# Patient Record
Sex: FEMALE | Race: WHITE | NOT HISPANIC OR LATINO | Employment: FULL TIME | URBAN - METROPOLITAN AREA
[De-identification: names, ages, dates, MRNs, and addresses within clinical notes are randomized per-mention and may not be internally consistent; named-entity substitution may affect disease eponyms.]

---

## 2018-04-07 ENCOUNTER — HOSPITAL ENCOUNTER (EMERGENCY)
Facility: CLINIC | Age: 33
Discharge: HOME | End: 2018-04-07
Attending: EMERGENCY MEDICINE | Admitting: EMERGENCY MEDICINE

## 2018-04-07 ENCOUNTER — APPOINTMENT (OUTPATIENT)
Dept: GENERAL RADIOLOGY | Facility: CLINIC | Age: 33
End: 2018-04-07
Attending: EMERGENCY MEDICINE

## 2018-04-07 VITALS
WEIGHT: 160 LBS | SYSTOLIC BLOOD PRESSURE: 84 MMHG | OXYGEN SATURATION: 99 % | HEART RATE: 79 BPM | TEMPERATURE: 97.9 F | BODY MASS INDEX: 28.35 KG/M2 | DIASTOLIC BLOOD PRESSURE: 50 MMHG

## 2018-04-07 DIAGNOSIS — M25.552 HIP PAIN, LEFT: ICD-10-CM

## 2018-04-07 LAB
BASOPHILS # BLD AUTO: 0.1 10E9/L (ref 0–0.2)
BASOPHILS NFR BLD AUTO: 0.5 %
CK SERPL-CCNC: 80 U/L (ref 30–225)
DIFFERENTIAL METHOD BLD: NORMAL
EOSINOPHIL # BLD AUTO: 0.2 10E9/L (ref 0–0.7)
EOSINOPHIL NFR BLD AUTO: 1.4 %
ERYTHROCYTE [DISTWIDTH] IN BLOOD BY AUTOMATED COUNT: 13.2 % (ref 10–15)
ERYTHROCYTE [SEDIMENTATION RATE] IN BLOOD BY WESTERGREN METHOD: 11 MM/H (ref 0–20)
HCT VFR BLD AUTO: 39.3 % (ref 35–47)
HGB BLD-MCNC: 12.8 G/DL (ref 11.7–15.7)
IMM GRANULOCYTES # BLD: 0 10E9/L (ref 0–0.4)
IMM GRANULOCYTES NFR BLD: 0.3 %
LYMPHOCYTES # BLD AUTO: 3.8 10E9/L (ref 0.8–5.3)
LYMPHOCYTES NFR BLD AUTO: 34.7 %
MCH RBC QN AUTO: 29.4 PG (ref 26.5–33)
MCHC RBC AUTO-ENTMCNC: 32.6 G/DL (ref 31.5–36.5)
MCV RBC AUTO: 90 FL (ref 78–100)
MONOCYTES # BLD AUTO: 0.8 10E9/L (ref 0–1.3)
MONOCYTES NFR BLD AUTO: 7.5 %
NEUTROPHILS # BLD AUTO: 6.1 10E9/L (ref 1.6–8.3)
NEUTROPHILS NFR BLD AUTO: 55.6 %
NRBC # BLD AUTO: 0 10*3/UL
NRBC BLD AUTO-RTO: 0 /100
PLATELET # BLD AUTO: 278 10E9/L (ref 150–450)
RBC # BLD AUTO: 4.35 10E12/L (ref 3.8–5.2)
WBC # BLD AUTO: 10.9 10E9/L (ref 4–11)

## 2018-04-07 PROCEDURE — 99284 EMERGENCY DEPT VISIT MOD MDM: CPT | Mod: 25

## 2018-04-07 PROCEDURE — 73502 X-RAY EXAM HIP UNI 2-3 VIEWS: CPT

## 2018-04-07 PROCEDURE — 96374 THER/PROPH/DIAG INJ IV PUSH: CPT

## 2018-04-07 PROCEDURE — 85025 COMPLETE CBC W/AUTO DIFF WBC: CPT | Performed by: EMERGENCY MEDICINE

## 2018-04-07 PROCEDURE — 82550 ASSAY OF CK (CPK): CPT | Performed by: EMERGENCY MEDICINE

## 2018-04-07 PROCEDURE — 85652 RBC SED RATE AUTOMATED: CPT | Performed by: EMERGENCY MEDICINE

## 2018-04-07 PROCEDURE — 25000128 H RX IP 250 OP 636: Performed by: EMERGENCY MEDICINE

## 2018-04-07 RX ORDER — KETOROLAC TROMETHAMINE 15 MG/ML
15 INJECTION, SOLUTION INTRAMUSCULAR; INTRAVENOUS ONCE
Status: COMPLETED | OUTPATIENT
Start: 2018-04-07 | End: 2018-04-07

## 2018-04-07 RX ADMIN — KETOROLAC TROMETHAMINE 15 MG: 15 INJECTION, SOLUTION INTRAMUSCULAR; INTRAVENOUS at 10:11

## 2018-04-07 ASSESSMENT — ENCOUNTER SYMPTOMS
WEAKNESS: 0
COLOR CHANGE: 0
JOINT SWELLING: 0
BACK PAIN: 0
NUMBNESS: 0

## 2018-04-07 NOTE — DISCHARGE INSTRUCTIONS
Please follow-up with your primary care provider and physical therapy.    Avoid lifting more than 10 #    Scheduled tylenol/ibuprofen for pain.    Discharge Instructions  Hip Pain, suspect secondary to lumbar radiculopathy  You were seen today for hip pain. Back pain can have many causes, but most will get better without surgery or other specific treatment. Sometimes there is a herniated ( slipped ) disc. We do not usually do MRI scans to look for these right away, since most herniated discs will get better on their own with time.  Today, we did not find any evidence that your back pain was caused by a serious condition. However, sometimes symptoms develop over time and cannot be found during an emergency visit, so it is very important that you follow up with your primary provider.  Generally, every Emergency Department visit should have a follow-up clinic visit with either a primary or a specialty clinic/provider. Please follow-up as instructed by your emergency provider today.    Return to the Emergency Department if:    You develop a fever with your back pain.     You have weakness or change in sensation in one or both legs.    You lose control of your bowels or bladder, or cannot empty your bladder (cannot pee).    Your pain gets much worse.     Follow-up with your provider:    Unless your pain has completely gone away, please make an appointment with your provider within one week. Most of the routine care for back pain is available in a clinic and not the Emergency Department. You may need further management of your back pain, such as more pain medication, imaging such as an X-ray or MRI, or physical therapy.    What can I do to help myself?    Remain Active -- People are often afraid that they will hurt their back further or delay recovery by remaining active, but this is one of the best things you can do for your back. In fact, staying in bed for a long time to rest is not recommended. Studies have shown that  people with low back pain recover faster when they remain active. Movement helps to bring blood flow to the muscles and relieve muscle spasms as well as preventing loss of muscle strength.    Heat -- Using a heating pad can help with low back pain during the first few weeks. Do not sleep with a heating pad, as you can be burned.     Pain medications - You may take a pain medication such as Tylenol  (acetaminophen), Advil , Motrin  (ibuprofen) or Aleve  (naproxen).  If you were given a prescription for medicine here today, be sure to read all of the information (including the package insert) that comes with your prescription.  This will include important information about the medicine, its side effects, and any warnings that you need to know about.  The pharmacist who fills the prescription can provide more information and answer questions you may have about the medicine.  If you have questions or concerns that the pharmacist cannot address, please call or return to the Emergency Department.   Remember that you can always come back to the Emergency Department if you are not able to see your regular provider in the amount of time listed above, if you get any new symptoms, or if there is anything that worries you.

## 2018-04-07 NOTE — ED NOTES
Pt presents to ED c/o left hip pain. Ongoing for the last 4d. Insidious onset, denies precipitating event. States she is a  and spends a lot on her feet. Denies hx of the pain, denies back pain. Denies numbness or tingling. Is able to bear weight. Pt is A&O x4, ABCs intact.

## 2018-04-07 NOTE — ED AVS SNAPSHOT
[facility] Department    [address]    [facility] [address]    Phone:  [phone]    Fax:  [phone]                                       Karely Munoz   MRN: [id number]    Department:  [facility] Emergency Department   Date of Visit:  4/7/2018           Patient Information     Date Of Birth          1985        Your diagnoses for this visit were:     Hip pain, left        You were seen by Donal Zuñiga MD.      Follow-up Information     Follow up with [facility]. Schedule an appointment as soon as possible for a visit in 2 days.    Contact information:    [address] [facility] [address] [phone]          Follow up with [facility] In 3 days.    Contact information:    [address]  [facility] [id number] [phone]        Follow up with [facility].    Specialty:  EMERGENCY MEDICINE    Why:  If symptoms worsen    Contact information:    [address]  [facility] [address] [phone]        Discharge Instructions       Please follow-up with your primary care provider and physical therapy.    Avoid lifting more than 10 #    Scheduled tylenol/ibuprofen for pain.    Discharge Instructions  Hip Pain, suspect secondary to lumbar radiculopathy  You were seen today for hip pain. Back pain can have many causes, but most will get better without surgery or other specific treatment. Sometimes there is a herniated ( slipped ) disc. We do not usually do MRI scans to look for these right away, since most herniated discs will get better on their own with time.  Today, we did not find any evidence that your back pain was caused by a serious condition. However, sometimes symptoms develop over time and cannot be found during an emergency visit, so it is very important that you follow up with your primary provider.  Generally, every Emergency  Department visit should have a follow-up clinic visit with either a primary or a specialty clinic/provider. Please follow-up as instructed by your emergency provider today.    Return to the Emergency Department if:    You develop a fever with your back pain.     You have weakness or change in sensation in one or both legs.    You lose control of your bowels or bladder, or cannot empty your bladder (cannot pee).    Your pain gets much worse.     Follow-up with your provider:    Unless your pain has completely gone away, please make an appointment with your provider within one week. Most of the routine care for back pain is available in a clinic and not the Emergency Department. You may need further management of your back pain, such as more pain medication, imaging such as an X-ray or MRI, or physical therapy.    What can I do to help myself?    Remain Active -- People are often afraid that they will hurt their back further or delay recovery by remaining active, but this is one of the best things you can do for your back. In fact, staying in bed for a long time to rest is not recommended. Studies have shown that people with low back pain recover faster when they remain active. Movement helps to bring blood flow to the muscles and relieve muscle spasms as well as preventing loss of muscle strength.    Heat -- Using a heating pad can help with low back pain during the first few weeks. Do not sleep with a heating pad, as you can be burned.     Pain medications - You may take a pain medication such as Tylenol  (acetaminophen), Advil , Motrin  (ibuprofen) or Aleve  (naproxen).  If you were given a prescription for medicine here today, be sure to read all of the information (including the package insert) that comes with your prescription.  This will include important information about the medicine, its side effects, and any warnings that you need to know about.  The pharmacist who fills the prescription can provide more  information and answer questions you may have about the medicine.  If you have questions or concerns that the pharmacist cannot address, please call or return to the Emergency Department.   Remember that you can always come back to the Emergency Department if you are not able to see your regular provider in the amount of time listed above, if you get any new symptoms, or if there is anything that worries you.      24 Hour Appointment Hotline       To make an appointment at any [facility], call [phone] ([phone]). If you don't have a family doctor or clinic, we will help you find one. [facility] are conveniently located to serve the needs of you and your family.             Review of your medicines      Our records show that you are taking the medicines listed below. If these are incorrect, please call your family doctor or clinic.        Dose / Directions Last dose taken    NO ACTIVE MEDICATIONS        Refills:  0                Procedures and tests performed during your visit     CBC + differential    CK total    Erythrocyte sedimentation rate auto    XR Pelvis and Hip Left 1 View      Orders Needing Specimen Collection     None      Pending Results     Date and Time Order Name Status Description    4/7/2018 1000 XR Pelvis and Hip Left 1 View Preliminary             Pending Culture Results     No orders found from 4/5/2018 to 4/8/2018.            Pending Results Instructions     If you had any lab results that were not finalized at the time of your Discharge, you can call the ED Lab Result RN at [phone]. You will be contacted by this team for any positive Lab results or changes in treatment. The nurses are available 7 days a week from 10A to 6:30P.  You can leave a message 24 hours per day and they will return your call.        Test Results From Your Hospital Stay        4/7/2018 10:41 AM      Narrative     PELVIS AND LEFT HIP ONE VIEW  4/7/2018 10:29 AM     COMPARISON:  None.    HISTORY: Pain.    FINDINGS: The visualized bones and joint spaces are within normal  limits.        Impression     IMPRESSION: No evidence for fracture, dislocation or significant  degenerative change of the pelvis or left hip.         4/7/2018 10:34 AM      Component Results     Component Value Ref Range & Units Status    WBC 10.9 4.0 - 11.0 10e9/L Final    RBC Count 4.35 3.8 - 5.2 10e12/L Final    Hemoglobin 12.8 11.7 - 15.7 g/dL Final    Hematocrit 39.3 35.0 - 47.0 % Final    MCV 90 78 - 100 fl Final    MCH 29.4 26.5 - 33.0 pg Final    MCHC 32.6 31.5 - 36.5 g/dL Final    RDW 13.2 10.0 - 15.0 % Final    Platelet Count 278 150 - 450 10e9/L Final    Diff Method Automated Method  Final    % Neutrophils 55.6 % Final    % Lymphocytes 34.7 % Final    % Monocytes 7.5 % Final    % Eosinophils 1.4 % Final    % Basophils 0.5 % Final    % Immature Granulocytes 0.3 % Final    Nucleated RBCs 0 0 /100 Final    Absolute Neutrophil 6.1 1.6 - 8.3 10e9/L Final    Absolute Lymphocytes 3.8 0.8 - 5.3 10e9/L Final    Absolute Monocytes 0.8 0.0 - 1.3 10e9/L Final    Absolute Eosinophils 0.2 0.0 - 0.7 10e9/L Final    Absolute Basophils 0.1 0.0 - 0.2 10e9/L Final    Abs Immature Granulocytes 0.0 0 - 0.4 10e9/L Final    Absolute Nucleated RBC 0.0  Final         4/7/2018 10:55 AM      Component Results     Component Value Ref Range & Units Status    Sed Rate 11 0 - 20 mm/h Final         4/7/2018 10:50 AM      Component Results     Component Value Ref Range & Units Status    CK Total 80 30 - 225 U/L Final                Clinical Quality Measure: Blood Pressure Screening     Your blood pressure was checked while you were in the emergency department today. The last reading we obtained was  BP: 103/68 . Please read the guidelines below about what these numbers mean and what you should do about them.  If your systolic blood pressure (the top number) is less than 120 and your diastolic blood pressure (the bottom number) is less than 80,  "then your blood pressure is normal. There is nothing more that you need to do about it.  If your systolic blood pressure (the top number) is 120-139 or your diastolic blood pressure (the bottom number) is 80-89, your blood pressure may be higher than it should be. You should have your blood pressure rechecked within a year by a primary care provider.  If your systolic blood pressure (the top number) is 140 or greater or your diastolic blood pressure (the bottom number) is 90 or greater, you may have high blood pressure. High blood pressure is treatable, but if left untreated over time it can put you at risk for heart attack, stroke, or kidney failure. You should have your blood pressure rechecked by a primary care provider within the next 4 weeks.  If your provider in the emergency department today gave you specific instructions to follow-up with your doctor or provider even sooner than that, you should follow that instruction and not wait for up to 4 weeks for your follow-up visit.        Thank you for choosing [facility]       Thank you for choosing [facility] for your care. Our goal is always to provide you with excellent care. Hearing back from our patients is one way we can continue to improve our services. Please take a few minutes to complete the written survey that you may receive in the mail after you visit with us. Thank you!        [employer] Information     [employer] lets you send messages to your doctor, view your test results, renew your prescriptions, schedule appointments and more. To sign up, go to [email or web address] . Click on \"Log in\" on the left side of the screen, which will take you to the Welcome page. Then click on \"Sign up Now\" on the right side of the page.     You will be asked to enter the access code listed below, as well as some personal information. Please follow the directions to create your username and password.     Your access code is: [email or web address]  Expires: 7/6/2018 11:22 AM     Your " access code will  in 90 days. If you need help or a new code, please call your [employer] clinic or [phone].        Care EveryWhere ID     This is your Care EveryWhere ID. This could be used by other organizations to access your [employer] medical records  [id number]        Equal Access to Services     CECILIA BROWN : John youngo Sosang, waaxda luqadaha, qaybta kaalmada yamilka, deng boss. So [facility] [phone].    ATENCIÓN: Si habla español, tiene a muhammad disposición servicios gratuitos de asistencia lingüística. Llame al [phone].    We comply with applicable federal civil rights laws and [place] laws. We do not discriminate on the basis of race, color, national origin, age, disability, sex, sexual orientation, or gender identity.            After Visit Summary       This is your record. Keep this with you and show to your community pharmacist(s) and doctor(s) at your next visit.

## 2018-04-07 NOTE — LETTER
April 7, 2018      To Whom It May Concern:      Karely TURNER Tammy was seen in our Emergency Department today, 04/07/18.  Please accommodate no lifting more than 10 lbs until symptoms improve.    Sincerely,        Donal Zuñiga MD

## 2018-04-07 NOTE — ED AVS SNAPSHOT
[facility] Emergency Department    [address]    [facility] [address]    Phone:  [phone]    Fax:  [phone]                                       Karely Munoz   MRN: [id number]    Department:  [facility] Emergency Department   Date of Visit:  4/7/2018           After Visit Summary Signature Page     I have received my discharge instructions, and my questions have been answered. I have discussed any challenges I see with this plan with the nurse or doctor.    ..........................................................................................................................................  Patient/Patient Representative Signature      ..........................................................................................................................................  Patient Representative Print Name and Relationship to Patient    ..................................................               ................................................  Date                                            Time    ..........................................................................................................................................  Reviewed by Signature/Title    ...................................................              ..............................................  Date                                                            Time

## 2018-04-07 NOTE — ED PROVIDER NOTES
History     Chief Complaint:  Hip Pain      HPI   Karely Munoz is a 32 year old female who presents with her boyfriend for evaluation of a 4 day history of hip pain. The patient today describes a shooting pain in her left hip starting across the anterior aspect of her hip, radiating laterally, and then inferiorly to the mid thigh. Patient is a  and spends a lot of time on her feet but otherwise denies trauma to the hip or any sudden movements / twists that could have offset the hip pain. She denies back pain and denies history of such pain in the past. She denies numbness or weakness down leg, shooting pains below the knee, skin / colour changes down leg. She has tried taking aleve without improvement of symptoms. She denies history of IV drugs.  She has not had any bowel or bladder incontinence.  Of note: Patient notes that as a  she does carry on her left side and adds that she did work last night.     Allergies:  No Known Allergies     Medications:    No medications     Past Medical History:    Kidney stone    Past Surgical History:    ENT Surgery  Hernia repair    Family History:    No family history on file.    Social History:  Patient reports that she has been smoking.  She has been smoking about 0.50 packs to one pack per day. She has never used smokeless tobacco. She reports that she drinks alcohol. She reports that she does not use illicit drugs.    Marital Status:   [2]    Review of Systems   Musculoskeletal: Negative for back pain and joint swelling.        Left hip pain.    Skin: Negative for color change.   Neurological: Negative for weakness and numbness.   All other systems reviewed and are negative.    Physical Exam   First Vitals:  BP: 103/68  Pulse: 79  Temp: 97.9  F (36.6  C)  Weight: 72.6 kg (160 lb)  SpO2: 100 %      Physical Exam  General:                        Well-nourished                        Speaking in full sentences  Eyes:                        Conjunctiva  without injection or scleral icterus                        PERRL  ENT:                        Moist mucous membranes                        Posterior oropharynx clear without erythema or exudate                        Nares patent                        Pinnae normal  Neck:                        Full ROM                        No stiffness appreciated  Resp:                        Lungs CTAB                        No crackles, wheezing or audible rubs                        Good air movement  CV:                                        Normal rate, regular rhythm                        S1 and S2 present                        No murmur, gallop or rub  GI:                        BS present                        Abdomen soft without distention                        Non-tender to light and deep palpation                        No guarding or rebound tenderness  Skin:                        Warm, dry, well perfused                        No rashes or open wounds on exposed skin  MSK:                        Moves all extremities                        No focal deformities or swelling                        LLE:                        No overlying skin changes, erythema nor warmth about left hip/greater trochanter                        Able to passively flex at the hip and internally rotate without any pain                        Some pain with external rotation at the hip                        No leg length discrepancy                        Leg is warm, well-perfused with 2+ DP pulse                        Compartment are soft                        No midline cervical, thoracic, or lumbar spine tenderness                        No overlying skin changes to spine or fluctuance                        Positive cross leg raise test                        Positive straight leg raise test  Neuro:                        Alert                        Answers questions appropriately                        Moves all  extremities equally                        Gait stable  Psych:                        Normal affect, normal mood    Emergency Department Course   Imaging:  XR Pelvis and Hip Left 1 View   Preliminary Result   IMPRESSION: No evidence for fracture, dislocation or significant   degenerative change of the pelvis or left hip.         Laboratory:  CBC: All within normal limits  Erythrocyte sedimentation:11  CK Total 80    Interventions:  1011: Toradol IV 15 mg    Emergency Department Course:  Past medical records, nursing notes, and vitals reviewed.  0950: I performed an exam of the patient and obtained history, as documented above.       1102: I rechecked the patient. She was feeling improved.     Findings and plan explained to the patient. Patient discharged home, status improved, with instructions regarding supportive care, medications, and reasons to return as well as the importance of close follow-up was reviewed.        Impression & Plan    Medical Decision Making:  Karely Munoz is a 32-year-old previously healthy female presenting to the ER accompanied by her significant other for evaluation of left hip pain.  VS on presentation are within normal limits.  Differential diagnosis includes musculoligamentous strain, lumbar radiculopathy, septic arthritis, inflammatory arthropathy, cellulitis, myositis, necrotizing infection, cauda equina/cord compression, among others.  Workup included imaging and laboratory studies.  Symptoms at present are suspicious for muscular ligamentous strain versus lumbar radiculopathy.  She describes radiation of pain from the left lateral aspect of her gluteus to the mid thigh.  She exhibits a positive crossed leg raise test as well as a straight leg raise test, further supportive of radiculopathy.  She works as a , is on her feet the majority of her shift, and carries heavy trays at awkward positions.  This is likely exacerbating her current symptoms.  Nonetheless other etiologies  for her current presentation were strongly considered.  She has not sustained any traumatic injuries, or falls to suggest acute bony abnormality.  X-ray of the pelvis and left hip are negative.  Septic arthritis also unlikely.  Patient has no immunocompromising conditions, denies associated fever, is afebrile in the ED, exhibits a normal WBC count, normal ESR, and I am able to range her hip to internal rotation without any exacerbation of pain.  There are no overlying skin changes to suggest cellulitis, necrotizing infection, and CK is within normal limits arguing against myositis.  Clinical impression and results of the above studies were discussed with the patient and her significant other at bedside.  She was provided the above analgesia noting improvements in symptoms.  At this time I feel she is stable for discharge home, symptomatic cares, and close outpatient follow-up. Recommended F/U with PT and referral information provided. Warning signs were discussed which should prompt return to the ER including worsening pain, fevers, inability to bear weight, inability to range her hip, or any other concerns.  All questions are answered prior to discharge.    Diagnosis:    ICD-10-CM    1. Hip pain, left M25.552        Disposition:  discharged to home    Sole MCCORMICK am serving as a scribe at 9:50 AM on 4/7/2018 to document services personally performed by Donal Zuñiga MD based on my observations and the provider's statements to me.    [facility] EMERGENCY DEPARTMENT       Donal Zuñiga MD  04/07/18 1122

## 2018-04-07 NOTE — LETTER
April 7, 2018      To Whom It May Concern:      Karely Munoz was seen in our Emergency Department today, 04/07/18.  I expect her condition to improve over the next 1-2 days.  She may return to work/school when improved.    Sincerely,        Rima Greenwood RN

## 2018-10-19 ENCOUNTER — APPOINTMENT (OUTPATIENT)
Dept: GENERAL RADIOLOGY | Facility: CLINIC | Age: 33
End: 2018-10-19
Attending: PHYSICIAN ASSISTANT

## 2018-10-19 ENCOUNTER — HOSPITAL ENCOUNTER (EMERGENCY)
Facility: CLINIC | Age: 33
Discharge: HOME | End: 2018-10-19
Attending: PHYSICIAN ASSISTANT | Admitting: PHYSICIAN ASSISTANT

## 2018-10-19 VITALS
DIASTOLIC BLOOD PRESSURE: 60 MMHG | WEIGHT: 140 LBS | RESPIRATION RATE: 18 BRPM | TEMPERATURE: 98.5 F | BODY MASS INDEX: 24.8 KG/M2 | HEIGHT: 63 IN | SYSTOLIC BLOOD PRESSURE: 111 MMHG | OXYGEN SATURATION: 94 %

## 2018-10-19 DIAGNOSIS — S52.501A CLOSED FRACTURE OF DISTAL END OF RIGHT RADIUS, UNSPECIFIED FRACTURE MORPHOLOGY, INITIAL ENCOUNTER: ICD-10-CM

## 2018-10-19 PROCEDURE — 29125 APPL SHORT ARM SPLINT STATIC: CPT | Mod: RT

## 2018-10-19 PROCEDURE — 99284 EMERGENCY DEPT VISIT MOD MDM: CPT

## 2018-10-19 PROCEDURE — 73130 X-RAY EXAM OF HAND: CPT | Mod: RT

## 2018-10-19 PROCEDURE — 73110 X-RAY EXAM OF WRIST: CPT | Mod: RT

## 2018-10-19 RX ORDER — HYDROCODONE BITARTRATE AND ACETAMINOPHEN 5; 325 MG/1; MG/1
1-2 TABLET ORAL EVERY 6 HOURS PRN
Qty: 20 TABLET | Refills: 0 | Status: SHIPPED | OUTPATIENT
Start: 2018-10-19 | End: 2019-09-05

## 2018-10-19 ASSESSMENT — ENCOUNTER SYMPTOMS
ARTHRALGIAS: 1
MYALGIAS: 1

## 2018-10-19 NOTE — ED AVS SNAPSHOT
[facility] Emergency Department    [address]    [facility] [address]    Phone:  [phone]    Fax:  [phone]                                       Karely Munoz   MRN: [id number]    Department:  [facility] Emergency Department   Date of Visit:  10/19/2018           After Visit Summary Signature Page     I have received my discharge instructions, and my questions have been answered. I have discussed any challenges I see with this plan with the nurse or doctor.    ..........................................................................................................................................  Patient/Patient Representative Signature      ..........................................................................................................................................  Patient Representative Print Name and Relationship to Patient    ..................................................               ................................................  Date                                   Time    ..........................................................................................................................................  Reviewed by Signature/Title    ...................................................              ..............................................  Date                                               Time          22EPIC Rev 08/18

## 2018-10-19 NOTE — ED PROVIDER NOTES
"  History     Chief Complaint:  Wrist Pain    HPI   Karely Munoz is a 32 year old female with no pertinent medical history who presents with wrist pain. The patient reports that last night she fell off the cough and caught herself on her right hand. She states that when she woke up this morning her arm felt stiff and she has pain in her distal forearm that radiates down into her wrist and hand. She notes she took ibuprofen about 5.5 hours ago, but continues to have discomfort, prompting her presentation to the ED. She reports that she cannot straightened her fingers. She denies having any pain in her shoulder. She is right-hand dominant.    Allergies:  No known drug allergies    Medications:    The patient is currently on no regular medications.    Past Medical History:    Kidney stone    Past Surgical History:    ENT surgery  Hernia repair    Family History:    History reviewed. No pertinent family history.  later present at bedside.    Social History:  The patient presents to the ED alone.  Marital status:   Smoking status: Current Every Day Smoker, 0.5 packs/day  Alcohol use: Yes    Review of Systems   Musculoskeletal: Positive for arthralgias (right wrist) and myalgias (right distal forearm).        No right shoulder pain   All other systems reviewed and are negative.    Physical Exam     Patient Vitals for the past 24 hrs:   BP Temp Temp src Heart Rate Resp SpO2 Height Weight   10/19/18 1304 111/60 98.5  F (36.9  C) Oral 76 18 94 % 1.6 m (5' 3\") 63.5 kg (140 lb)       Physical Exam   Constitutional: She is oriented to person, place, and time. She appears distressed.   Cardiovascular: Normal rate and intact distal pulses.    Pulmonary/Chest: Effort normal. No respiratory distress.   Musculoskeletal:        Right wrist: She exhibits decreased range of motion, bony tenderness (over distal radius) and swelling. She exhibits no deformity.        Right hand: She exhibits decreased range of motion and " bony tenderness (over first through third metacarpals.). She exhibits normal two-point discrimination, normal capillary refill, no deformity, no laceration and no swelling. Normal sensation noted. Normal strength noted.   Neurological: She is alert and oriented to person, place, and time.   Sensation intact       Emergency Department Course     Imaging:  Radiographic findings were communicated with the patient who voiced understanding of the findings.    XR WRIST RIGHT G/E 3 VIEWS:  Acute nondisplaced fracture of the distal radius is best  seen on the oblique, and lateral projections. Near-anatomic alignment.  There is some borderline widening of the scapholunate region that  could represent ligamentous injury.  As read by Radiology.    XR HAND RIGHT G/E 3 VIEWS:  No acute fracture of the right hand. Subtle fracture of  the distal radius that is nondisplaced.  As read by Radiology.     Procedures:    Narrative: Procedure: Splint Placement       INDICATION: Wrist fracture      LOCATION: Right wrist        TECHNIQUE:  Custom plaster sugar tong splint was applied to the upper right extremity and after placement I checked and adjusted the fit to ensure proper positioning.  The patient was more comfortable with the splint in place.  Sensation and circulation are intact after splint placement. The patient tolerated the procedure well without difficulty.  There were no complications.    Emergency Department Course:  Past medical records, nursing notes, and vitals reviewed.  1315: I performed an exam of the patient and obtained history, as documented above.   The patient was sent for a wrist x-ray and hand x-ray while in the emergency department, findings above.    1417: I rechecked the patient. Explained findings to the patient.    1422: I placed a splint on the patient's wrist as noted above.    Findings and plan explained to the patient. Patient discharged home with instructions regarding supportive care, medications,  and reasons to return. The importance of close follow-up was reviewed.    Impression & Plan      Medical Decision Makin year old female presenting with right hand and wrist pain. X-ray revealed non-displaced distal radius fracture. No other fractures were noted. It is not open. There is no evidence of neurovascular compromise. Patient was placed diogenes sugar tong splint and then in a sling. She remained neurovascularly intact after splinting. She will be discharged home with norco for pain. Splint care instructions discussed. Instructed to f/u with orthopedic surgery in the next week. She will return to the ED for any new or worsening symptoms, including uncontrolled pain, numbness or weakness, splint gets wet, or other concerning symptoms.     Diagnosis:    ICD-10-CM   1. Closed fracture of distal end of right radius, unspecified fracture morphology, initial encounter S52.501A       Disposition:  discharged to home    Discharge Medications:   Details   HYDROcodone-acetaminophen (NORCO) 5-325 MG per tablet Take 1-2 tablets by mouth every 6 hours as needed for pain, Disp-20 tablet, R-0, Local Print       Sandra Guillermo  10/19/2018   [facility] EMERGENCY DEPARTMENT  Sandra MCCORMICK, am serving as a scribe at 1:14 PM on 10/19/2018 to document services personally performed by Vickie Baker PA-C based on my observations and the provider's statements to me.        Vickie Baker PA-C  10/19/18 1604

## 2018-10-19 NOTE — LETTER
October 19, 2018      To Whom It May Concern:      Karely Munoz was seen in our Emergency Department today, 10/19/18.  I expect her condition to improve over the next 1-3 days.  She may return to work/school when improved.    Sincerely,        Britney Laird RN

## 2018-10-19 NOTE — ED AVS SNAPSHOT
[facility] Emergency Department    [address]    [facility] [address]    Phone:  [phone]    Fax:  [phone]                                       Karely Munoz   MRN: [id number]    Department:  [facility] Emergency Department   Date of Visit:  10/19/2018           Patient Information     Date Of Birth          1985        Your diagnoses for this visit were:     Closed fracture of distal end of right radius, unspecified fracture morphology, initial encounter        You were seen by Vickie Baker PA-C.      Follow-up Information     Schedule an appointment as soon as possible for a visit with [facility].    Contact information:    [address], [address] [address]  [phone]          Follow up with [facility] Emergency Department.    Specialty:  EMERGENCY MEDICINE    Why:  If symptoms worsen    Contact information:    [address] [facility] [id number] [phone]        Discharge Instructions       Discharge Instructions  Splint Care    You had a splint put on today to help protect your injury and help it heal.  Splints are used to treat things like strains, sprains, large cuts, and fractures (broken bones). Splints are temporary and are designed to allow for swelling.    Be sure your splint is not too tight!  If you splint is too tight, it may cause loss of blood supply.  Signs of your splint being too tight include: your arm or leg hurting a lot more; your fingers or toes getting numb, cold, pale or blue; or your child is crying, fussing or seeming restless.    Generally, every Emergency Department visit should have a follow-up clinic visit with either a primary or a specialty clinic/provider. Please follow-up as instructed by your emergency provider today.  Return to the Emergency Department right away if:    You have increased pain or pressure around the injury.    You have  numbness, tingling, or cool, pale, or blue toes or fingers past the injury.    Your child is more fussy than normal, crying a lot, or restless.    Your splint becomes soft, breaks, or is wet.    Your splint begins to smell bad.    Your splint is cutting into your skin.    Home care:    Keep the injured area above the level of your heart while laying or sitting down.  This will help decrease the swelling and the pain.    Keep the splint dry.    Do not put objects down or inside the splint.    If there is an elastic bandage (Ace  wrap) holding the splint on this may be loosened slightly to relieve pressure or pain.  If pain continues return to the Emergency Department right away.    Do not remove your splint by yourself unless told to by your provider.    Follow-up:  Sometimes the splint put on in the Emergency Department needs to be changed once the swelling has gone down and a more permanent cast needs to be placed.  This is usually done by a bone specialist provider (Orthopedist).  Follow the instructions given to you by your provider today.    If you were given a prescription for medicine here today, be sure to read all of the information (including the package insert) that comes with your prescription.  This will include important information about the medicine, its side effects, and any warnings that you need to know about.  The pharmacist who fills the prescription can provide more information and answer questions you may have about the medicine.  If you have questions or concerns that the pharmacist cannot address, please call or return to the Emergency Department.     Remember that you can always come back to the Emergency Department if you are not able to see your regular provider in the amount of time listed above, if you get any new symptoms, or if there is anything that worries you.    Understanding a Distal Radius Fracture      A fracture is a broken bone. A fracture in the distal radius is a break in  the lower end of the radius. This is the larger bone in the forearm. Because the break occurs near the wrist, it is often called a wrist fracture.    The bone may be cracked, or it may be broken into 2 or more pieces. The pieces of bone may be lined up or they may have moved out of place. Sometimes, the bone may break through the skin. Nearby nerves, tissues, and joints also may be damaged. Depending on the severity of the fracture, healing may take several months or longer.  What causes a distal radius fracture?  This type of fracture is most often caused from a fall on an outstretched hand. It can also be caused from a blow, accident, or sports injury.  Symptoms of a distal radius fracture  Symptoms can include pain, swelling, and bruising. If the bone breaks through the skin, external bleeding can also occur. The wrist may look crooked, deformed, or bent. It may be hard to move or use the arm, wrist, and hand for normal tasks and activities.  Treating a distal radius fracture  Treatment depends on how serious the fracture is. If needed, the bone is put back into place. This may be done with or without surgery. If surgery is needed, the surgeon may use devices such as pins, plates, or screws to hold the bone together. You may need to wear a splint or cast for a month or longer to protect the bone and keep it in place during healing. Other treatments may be also used to help reduce symptoms or regain function. These include:    Cold packs. Putting an ice pack on the injured area may help reduce swelling and pain.    Raising the arm and wrist. Keeping the arm and wrist raised above heart level may help reduce swelling.    Pain medicines. Taking prescription or over-the-counter pain medicines may help reduce pain and swelling.    Exercises. Doing certain exercises at home or with a physical therapist can help restore strength, flexibility, and range of motion in your arm, wrist, and hand. In general, exercises are  not started until after the splint or cast is removed.  Possible complications of a distal radius fracture  These can include:    Poor healing of the bone    Weakness, stiffness, or loss of range of motion in the arm, wrist, or hand    Osteoarthritis in the wrist joint  When to call your healthcare provider  Call your healthcare provider right away if you have any of these:    Fever of 100.4 F (38 C) or higher, or as directed    Symptoms that don t get better with treatment, or get worse    Numbness, coldness, or swelling in your arm, hand, or fingers    Fingernails that turn blue or gray in color    A splint or cast that is damaged or feels too tight or loose    New symptoms   Date Last Reviewed: 3/10/2016    4809-2019 [employer]. [address] [place] [address], [place] [address]. All rights reserved. This information is not intended as a substitute for professional medical care. Always follow your healthcare professional's instructions.          24 Hour Appointment Hotline       To make an appointment at any [facility], call [phone] ([phone]). If you don't have a family doctor or clinic, we will help you find one. [facility] clinics are conveniently located to serve the needs of you and your family.             Review of your medicines      START taking        Dose / Directions Last dose taken    HYDROcodone-acetaminophen 5-325 MG per tablet   Commonly known as:  NORCO   Dose:  1-2 tablet   Quantity:  20 tablet        Take 1-2 tablets by mouth every 6 hours as needed for pain   Refills:  0          Our records show that you are taking the medicines listed below. If these are incorrect, please call your family doctor or clinic.        Dose / Directions Last dose taken    NO ACTIVE MEDICATIONS        Refills:  0                Information about OPIOIDS     PRESCRIPTION OPIOIDS: WHAT YOU NEED TO KNOW   We gave you an opioid (narcotic) pain medicine. It is important to manage your pain, but  opioids are not always the best choice. You should first try all the other options your care team gave you. Take this medicine for as short a time (and as few doses) as possible.    Some activities can increase your pain, such as bandage changes or therapy sessions. It may help to take your pain medicine 30 to 60 minutes before these activities. Reduce your stress by getting enough sleep, working on hobbies you enjoy and practicing relaxation or meditation. Talk to your care team about ways to manage your pain beyond prescription opioids.    These medicines have risks:    DO NOT drive when on new or higher doses of pain medicine. These medicines can affect your alertness and reaction times, and you could be arrested for driving under the influence (DUI). If you need to use opioids long-term, talk to your care team about driving.    DO NOT operate heavy machinery    DO NOT do any other dangerous activities while taking these medicines.    DO NOT drink any alcohol while taking these medicines.     If the opioid prescribed includes acetaminophen, DO NOT take with any other medicines that contain acetaminophen. Read all labels carefully. Look for the word  acetaminophen  or  Tylenol.  Ask your pharmacist if you have questions or are unsure.    You can get addicted to pain medicines, especially if you have a history of addiction (chemical, alcohol or substance dependence). Talk to your care team about ways to reduce this risk.    All opioids tend to cause constipation. Drink plenty of water and eat foods that have a lot of fiber, such as fruits, vegetables, prune juice, apple juice and high-fiber cereal. Take a laxative (Miralax, milk of magnesia, Colace, Senna) if you don t move your bowels at least every other day. Other side effects include upset stomach, sleepiness, dizziness, throwing up, tolerance (needing more of the medicine to have the same effect), physical dependence and slowed breathing.    Store your pills  in a secure place, locked if possible. We will not replace any lost or stolen medicine. If you don t finish your medicine, please throw away (dispose) as directed by your pharmacist. The [place] Pollution Control Agency has more information about safe disposal: https://www.pca.state.[place]/living-green/managing-unwanted-medications        Prescriptions were sent or printed at these locations (1 Prescription)                   Other Prescriptions                Printed at Department/Unit printer (1 of 1)         HYDROcodone-acetaminophen (NORCO) 5-325 MG per tablet                Procedures and tests performed during your visit     XR Hand Right G/E 3 Views    XR Wrist Right G/E 3 Views      Orders Needing Specimen Collection     None      Pending Results     Date and Time Order Name Status Description    10/19/2018 1318 XR Wrist Right G/E 3 Views Preliminary     10/19/2018 1318 XR Hand Right G/E 3 Views Preliminary             Pending Culture Results     No orders found from 10/17/2018 to 10/20/2018.            Pending Results Instructions     If you had any lab results that were not finalized at the time of your Discharge, you can call the ED Lab Result RN at [phone]. You will be contacted by this team for any positive Lab results or changes in treatment. The nurses are available 7 days a week from 10A to 6:30P.  You can leave a message 24 hours per day and they will return your call.        Test Results From Your Hospital Stay        10/19/2018  2:05 PM      Narrative     RIGHT HAND THREE OR MORE VIEWS   10/19/2018 1:57 PM     HISTORY: Pain after a fall, mostly over 1st-3rd metacarpals.     COMPARISON: None.        Impression     IMPRESSION: No acute fracture of the right hand. Subtle fracture of  the distal radius that is nondisplaced.         10/19/2018  2:05 PM      Narrative     RIGHT WRIST THREE OR MORE VIEWS 10/19/2018 1:58 PM     HISTORY: Pain and swelling over radial aspect after a fall.      COMPARISON: None.        Impression     IMPRESSION: Acute nondisplaced fracture of the distal radius is best  seen on the oblique, and lateral projections. Near-anatomic alignment.  There is some borderline widening of the scapholunate region that  could represent ligamentous injury.                Clinical Quality Measure: Blood Pressure Screening     Your blood pressure was checked while you were in the emergency department today. The last reading we obtained was  BP: 111/60 . Please read the guidelines below about what these numbers mean and what you should do about them.  If your systolic blood pressure (the top number) is less than 120 and your diastolic blood pressure (the bottom number) is less than 80, then your blood pressure is normal. There is nothing more that you need to do about it.  If your systolic blood pressure (the top number) is 120-139 or your diastolic blood pressure (the bottom number) is 80-89, your blood pressure may be higher than it should be. You should have your blood pressure rechecked within a year by a primary care provider.  If your systolic blood pressure (the top number) is 140 or greater or your diastolic blood pressure (the bottom number) is 90 or greater, you may have high blood pressure. High blood pressure is treatable, but if left untreated over time it can put you at risk for heart attack, stroke, or kidney failure. You should have your blood pressure rechecked by a primary care provider within the next 4 weeks.  If your provider in the emergency department today gave you specific instructions to follow-up with your doctor or provider even sooner than that, you should follow that instruction and not wait for up to 4 weeks for your follow-up visit.        Thank you for choosing [facility]       Thank you for choosing [facility] for your care. Our goal is always to provide you with excellent care. Hearing back from our patients is one way we can continue to improve our  "services. Please take a few minutes to complete the written survey that you may receive in the mail after you visit with us. Thank you!        [employer] Information     [employer] lets you send messages to your doctor, view your test results, renew your prescriptions, schedule appointments and more. To sign up, go to [email or web address] . Click on \"Log in\" on the left side of the screen, which will take you to the Welcome page. Then click on \"Sign up Now\" on the right side of the page.     You will be asked to enter the access code listed below, as well as some personal information. Please follow the directions to create your username and password.     Your access code is: [email or web address]  Expires: 2019  2:42 PM     Your access code will  in 90 days. If you need help or a new code, please call your [facility] clinic or [phone].        Care EveryWhere ID     This is your Care EveryWhere ID. This could be used by other organizations to access your [facility] medical records  [id number]        Equal Access to Services     [facility] : Hadii calvin West, wageoda gertrude, qaybmonica prather, deng boss. So [facility] [phone].    ATENCIÓN: Si habla español, tiene a muhammad disposición servicios gratuitos de asistencia lingüística. Llame al [phone].    We comply with applicable federal civil rights laws and [place] laws. We do not discriminate on the basis of race, color, national origin, age, disability, sex, sexual orientation, or gender identity.            After Visit Summary       This is your record. Keep this with you and show to your community pharmacist(s) and doctor(s) at your next visit.                  "

## 2018-10-19 NOTE — DISCHARGE INSTRUCTIONS
Discharge Instructions  Splint Care    You had a splint put on today to help protect your injury and help it heal.  Splints are used to treat things like strains, sprains, large cuts, and fractures (broken bones). Splints are temporary and are designed to allow for swelling.    Be sure your splint is not too tight!  If you splint is too tight, it may cause loss of blood supply.  Signs of your splint being too tight include: your arm or leg hurting a lot more; your fingers or toes getting numb, cold, pale or blue; or your child is crying, fussing or seeming restless.    Generally, every Emergency Department visit should have a follow-up clinic visit with either a primary or a specialty clinic/provider. Please follow-up as instructed by your emergency provider today.  Return to the Emergency Department right away if:    You have increased pain or pressure around the injury.    You have numbness, tingling, or cool, pale, or blue toes or fingers past the injury.    Your child is more fussy than normal, crying a lot, or restless.    Your splint becomes soft, breaks, or is wet.    Your splint begins to smell bad.    Your splint is cutting into your skin.    Home care:    Keep the injured area above the level of your heart while laying or sitting down.  This will help decrease the swelling and the pain.    Keep the splint dry.    Do not put objects down or inside the splint.    If there is an elastic bandage (Ace  wrap) holding the splint on this may be loosened slightly to relieve pressure or pain.  If pain continues return to the Emergency Department right away.    Do not remove your splint by yourself unless told to by your provider.    Follow-up:  Sometimes the splint put on in the Emergency Department needs to be changed once the swelling has gone down and a more permanent cast needs to be placed.  This is usually done by a bone specialist provider (Orthopedist).  Follow the instructions given to you by your provider  today.    If you were given a prescription for medicine here today, be sure to read all of the information (including the package insert) that comes with your prescription.  This will include important information about the medicine, its side effects, and any warnings that you need to know about.  The pharmacist who fills the prescription can provide more information and answer questions you may have about the medicine.  If you have questions or concerns that the pharmacist cannot address, please call or return to the Emergency Department.     Remember that you can always come back to the Emergency Department if you are not able to see your regular provider in the amount of time listed above, if you get any new symptoms, or if there is anything that worries you.    Understanding a Distal Radius Fracture      A fracture is a broken bone. A fracture in the distal radius is a break in the lower end of the radius. This is the larger bone in the forearm. Because the break occurs near the wrist, it is often called a wrist fracture.    The bone may be cracked, or it may be broken into 2 or more pieces. The pieces of bone may be lined up or they may have moved out of place. Sometimes, the bone may break through the skin. Nearby nerves, tissues, and joints also may be damaged. Depending on the severity of the fracture, healing may take several months or longer.  What causes a distal radius fracture?  This type of fracture is most often caused from a fall on an outstretched hand. It can also be caused from a blow, accident, or sports injury.  Symptoms of a distal radius fracture  Symptoms can include pain, swelling, and bruising. If the bone breaks through the skin, external bleeding can also occur. The wrist may look crooked, deformed, or bent. It may be hard to move or use the arm, wrist, and hand for normal tasks and activities.  Treating a distal radius fracture  Treatment depends on how serious the fracture is. If  needed, the bone is put back into place. This may be done with or without surgery. If surgery is needed, the surgeon may use devices such as pins, plates, or screws to hold the bone together. You may need to wear a splint or cast for a month or longer to protect the bone and keep it in place during healing. Other treatments may be also used to help reduce symptoms or regain function. These include:    Cold packs. Putting an ice pack on the injured area may help reduce swelling and pain.    Raising the arm and wrist. Keeping the arm and wrist raised above heart level may help reduce swelling.    Pain medicines. Taking prescription or over-the-counter pain medicines may help reduce pain and swelling.    Exercises. Doing certain exercises at home or with a physical therapist can help restore strength, flexibility, and range of motion in your arm, wrist, and hand. In general, exercises are not started until after the splint or cast is removed.  Possible complications of a distal radius fracture  These can include:    Poor healing of the bone    Weakness, stiffness, or loss of range of motion in the arm, wrist, or hand    Osteoarthritis in the wrist joint  When to call your healthcare provider  Call your healthcare provider right away if you have any of these:    Fever of 100.4 F (38 C) or higher, or as directed    Symptoms that don t get better with treatment, or get worse    Numbness, coldness, or swelling in your arm, hand, or fingers    Fingernails that turn blue or gray in color    A splint or cast that is damaged or feels too tight or loose    New symptoms   Date Last Reviewed: 3/10/2016    5904-4857 [employer]. [address] [place] [address], [place] [address]. All rights reserved. This information is not intended as a substitute for professional medical care. Always follow your healthcare professional's instructions.

## 2019-05-09 ENCOUNTER — TRANSFERRED RECORDS (OUTPATIENT)
Dept: HEALTH INFORMATION MANAGEMENT | Facility: CLINIC | Age: 34
End: 2019-05-09

## 2019-05-09 LAB
ABO + RH BLD: NORMAL
ABO + RH BLD: NORMAL
BLD GP AB SCN SERPL QL: NORMAL
C TRACH DNA SPEC QL PROBE+SIG AMP: NEGATIVE
HBV SURFACE AG SERPL QL IA: NON REACTIVE
HEMOGLOBIN: 13.7 G/DL (ref 11.7–15.7)
HIV 1+2 AB+HIV1 P24 AG SERPL QL IA: NON REACTIVE
N GONORRHOEA DNA SPEC QL PROBE+SIG AMP: NEGATIVE
PAP: NORMAL
PLATELET # BLD AUTO: 277 10^9/L
RUBELLA ANTIBODY IGG QUANTITATIVE: NORMAL IU/ML
TREPONEMA ANTIBODIES: NON REACTIVE

## 2019-07-11 ENCOUNTER — HOSPITAL ENCOUNTER (OUTPATIENT)
Facility: CLINIC | Age: 34
Discharge: HOME | End: 2019-07-11
Attending: OBSTETRICS & GYNECOLOGY | Admitting: OBSTETRICS & GYNECOLOGY
Payer: MEDICAID

## 2019-07-11 VITALS
BODY MASS INDEX: 24.8 KG/M2 | HEIGHT: 63 IN | TEMPERATURE: 98.5 F | RESPIRATION RATE: 18 BRPM | DIASTOLIC BLOOD PRESSURE: 55 MMHG | SYSTOLIC BLOOD PRESSURE: 101 MMHG

## 2019-07-11 PROBLEM — Z36.89 ENCOUNTER FOR TRIAGE IN PREGNANT PATIENT: Status: ACTIVE | Noted: 2019-07-11

## 2019-07-11 LAB
ALBUMIN UR-MCNC: NEGATIVE MG/DL
APPEARANCE UR: CLEAR
BILIRUB UR QL STRIP: NEGATIVE
COLOR UR AUTO: NORMAL
GLUCOSE UR STRIP-MCNC: NEGATIVE MG/DL
HGB UR QL STRIP: NEGATIVE
KETONES UR STRIP-MCNC: NEGATIVE MG/DL
LEUKOCYTE ESTERASE UR QL STRIP: NEGATIVE
NITRATE UR QL: NEGATIVE
PH UR STRIP: 7 PH (ref 5–7)
RBC #/AREA URNS AUTO: 0 /HPF (ref 0–2)
SOURCE: NORMAL
SP GR UR STRIP: 1 (ref 1–1.03)
SQUAMOUS #/AREA URNS AUTO: 1 /HPF (ref 0–1)
UROBILINOGEN UR STRIP-MCNC: NORMAL MG/DL (ref 0–2)
WBC #/AREA URNS AUTO: <1 /HPF (ref 0–5)

## 2019-07-11 PROCEDURE — 81001 URINALYSIS AUTO W/SCOPE: CPT | Performed by: OBSTETRICS & GYNECOLOGY

## 2019-07-11 PROCEDURE — G0463 HOSPITAL OUTPT CLINIC VISIT: HCPCS

## 2019-07-12 NOTE — PROVIDER NOTIFICATION
19   Provider Notification   Provider Name/Title Dr. Adorno   Method of Notification Phone   Data: Patient presented to [facility]: 2019  6:56 PM.  Reason for maternal/fetal assessment is abdominal pain, back ache and pressure. Patient reports Cramping that is coming and going. back ache and pelvic pressure.  Patient is a .  Prenatal record reviewed. Pregnancy has been uncomplicated..  Gestational Age 17w2d. VSS. Fetal movement present. Patient denies leaking of vaginal fluid/rupture of membranes, vaginal bleeding, nausea, vomiting, headache, visual disturbances, epigastric or URQ pain, significant edema. Support person is present.   Action: Verbal consent for EFM. Triage assessment completed. Bill of rights reviewed.  Response: Patient verbalized agreement with plan. Will contact Dr Clair Adorno with update and further orders.    Unable to hear heart tones. Order received to have a second person try, if unable obtain with doptone order bedside ultrasound variability

## 2019-07-12 NOTE — DISCHARGE INSTRUCTIONS
Discharge Instruction for Undelivered Patients      You were seen for: Labor Assessment and Fetal Assessment  We Consulted: Dr. Adorno  You had (Test or Medicine):Urine analysis- normal results. Doptone. Heart rate variable 140's-150's     Diet:   Drink 8 to 12 glasses of liquids (milk, juice, water) every day.  You may eat meals and snacks.     Activity:  Take it easy, drink plenty of fluids     Call your provider if you notice:  Swelling in your face or increased swelling in your hands or legs.  Headaches that are not relieved by Tylenol (acetaminophen).  Changes in your vision (blurring: seeing spots or stars.)  Nausea (sick to your stomach) and vomiting (throwing up).   Weight gain of 5 pounds or more per week.  Heartburn that doesn't go away.  Signs of bladder infection: pain when you urinate (use the toilet), need to go more often and more urgently.  The bag of felton (rupture of membranes) breaks, or you notice leaking in your underwear.  Bright red blood in your underwear.  Abdominal (lower belly) or stomach pain.  For first baby: Contractions (tightening) less than 5 minutes apart for one hour or more.  Second (plus) baby: Contractions (tightening) less than 10 minutes apart and getting stronger.  *If less than 34 weeks: Contractions (tightenings) more than 6 times in one hour.  Increase or change in vaginal discharge (note the color and amount)  Other:     Follow-up:  Make an appointment to be seen on to be seen for follow

## 2019-08-13 ENCOUNTER — HOSPITAL ENCOUNTER (OUTPATIENT)
Facility: CLINIC | Age: 34
Discharge: HOME | End: 2019-08-13
Attending: OBSTETRICS & GYNECOLOGY | Admitting: OBSTETRICS & GYNECOLOGY
Payer: COMMERCIAL

## 2019-08-13 VITALS
SYSTOLIC BLOOD PRESSURE: 101 MMHG | TEMPERATURE: 99 F | DIASTOLIC BLOOD PRESSURE: 56 MMHG | BODY MASS INDEX: 28.17 KG/M2 | HEIGHT: 63 IN | HEART RATE: 91 BPM | WEIGHT: 159 LBS | RESPIRATION RATE: 16 BRPM

## 2019-08-13 LAB
ALBUMIN UR-MCNC: 10 MG/DL
APPEARANCE UR: ABNORMAL
BACTERIA #/AREA URNS HPF: ABNORMAL /HPF
BILIRUB UR QL STRIP: NEGATIVE
COLOR UR AUTO: YELLOW
GLUCOSE UR STRIP-MCNC: NEGATIVE MG/DL
HGB UR QL STRIP: NEGATIVE
KETONES UR STRIP-MCNC: NEGATIVE MG/DL
LEUKOCYTE ESTERASE UR QL STRIP: NEGATIVE
MUCOUS THREADS #/AREA URNS LPF: PRESENT /LPF
NITRATE UR QL: NEGATIVE
PH UR STRIP: 8 PH (ref 5–7)
RBC #/AREA URNS AUTO: 0 /HPF (ref 0–2)
RUPTURE OF FETAL MEMBRANES BY ROM PLUS: NEGATIVE
SOURCE: ABNORMAL
SP GR UR STRIP: 1.02 (ref 1–1.03)
SQUAMOUS #/AREA URNS AUTO: 1 /HPF (ref 0–1)
UROBILINOGEN UR STRIP-MCNC: NORMAL MG/DL (ref 0–2)
WBC #/AREA URNS AUTO: 1 /HPF (ref 0–5)

## 2019-08-13 PROCEDURE — 84112 EVAL AMNIOTIC FLUID PROTEIN: CPT | Performed by: OBSTETRICS & GYNECOLOGY

## 2019-08-13 PROCEDURE — 81001 URINALYSIS AUTO W/SCOPE: CPT | Performed by: OBSTETRICS & GYNECOLOGY

## 2019-08-13 PROCEDURE — G0463 HOSPITAL OUTPT CLINIC VISIT: HCPCS

## 2019-08-13 PROCEDURE — G0463 HOSPITAL OUTPT CLINIC VISIT: HCPCS | Mod: 25

## 2019-08-13 RX ORDER — ONDANSETRON 2 MG/ML
4 INJECTION INTRAMUSCULAR; INTRAVENOUS EVERY 6 HOURS PRN
Status: DISCONTINUED | OUTPATIENT
Start: 2019-08-13 | End: 2019-08-13 | Stop reason: HOSPADM

## 2019-08-13 RX ORDER — PRENATAL VIT/IRON FUM/FOLIC AC 27MG-0.8MG
1 TABLET ORAL DAILY
COMMUNITY
End: 2021-10-18

## 2019-08-13 ASSESSMENT — MIFFLIN-ST. JEOR: SCORE: 1400.35

## 2019-08-13 NOTE — DISCHARGE INSTRUCTIONS
Discharge Instruction for Undelivered Patients      You were seen for: Membrane Assessment  We Consulted: Dr. Dunn  You had (Test or Medicine):fetal doptones, ROM Plus and UA    Diet:   Drink 8 to 12 glasses of liquids (milk, juice, water) every day.  You may eat meals and snacks.     Activity:  No restrictions    Call your provider if you notice:  Swelling in your face or increased swelling in your hands or legs.  Headaches that are not relieved by Tylenol (acetaminophen).  Changes in your vision (blurring: seeing spots or stars.)  Nausea (sick to your stomach) and vomiting (throwing up).   Weight gain of 5 pounds or more per week.  Heartburn that doesn't go away.  Signs of bladder infection: pain when you urinate (use the toilet), need to go more often and more urgently.  The bag of felton (rupture of membranes) breaks, or you notice leaking in your underwear.  Bright red blood in your underwear.  Abdominal (lower belly) or stomach pain.  Second (plus) baby: Contractions (tightening) less than 10 minutes apart and getting stronger.  *If less than 34 weeks: Contractions (tightenings) more than 6 times in one hour.  Increase or change in vaginal discharge (note the color and amount)      Follow-up:  As scheduled in the clinic

## 2019-08-13 NOTE — PLAN OF CARE
Data: Patient assessed in the [facility] for leaking vaginal fluid.  Cervical exam not examined.  Membranes intact.  Contractions x1.  Action:  Presumed adequate fetal oxygenation documented (see flow record). Discharge instructions reviewed.  Patient instructed to report change in fetal movement, vaginal leaking of fluid or bleeding, abdominal pain, or any concerns related to the pregnancy to her nurse/physician.    Response: Orders to discharge home per Brittney Dunn.  Patient verbalized understanding of education and verbalized agreement with plan. Discharged to home at 1522.

## 2019-08-13 NOTE — PROVIDER NOTIFICATION
08/13/19 1510   Provider Notification   Provider Name/Title Dr. Dunn   Method of Notification Phone   Request Evaluate - Remote   Notification Reason Lab/Diagnostic Study   Md notified all the labs are WNL and ROM plus was negative.  Orders received to discharge to home.

## 2019-08-13 NOTE — PLAN OF CARE
Data: Patient presented to [facility]: 2019  2:00 PM.  Reason for maternal/fetal assessment is leaking vaginal fluid. Patient reports that after her bath around 1030 she has been feeling some leaking and also felt a little crampy..  Patient is a .  Prenatal record reviewed. Pregnancy has been uncomplicated..  Gestational Age 21w2d. VSS. Fetal movement present. Patient denies vaginal bleeding, abdominal pain, nausea, vomiting, headache, visual disturbances, epigastric or URQ pain, significant edema. Support person is present.   Action: Verbal consent for EFM. Triage assessment completed. Bill of rights reviewed.  Response: Patient verbalized agreement with plan. Will contact Dr Brittney Dunn with update and further orders.

## 2019-09-05 ENCOUNTER — TELEPHONE (OUTPATIENT)
Dept: OBGYN | Facility: CLINIC | Age: 34
End: 2019-09-05

## 2019-09-05 ENCOUNTER — PRENATAL OFFICE VISIT (OUTPATIENT)
Dept: NURSING | Facility: CLINIC | Age: 34
End: 2019-09-05
Payer: COMMERCIAL

## 2019-09-05 DIAGNOSIS — Z34.90 SUPERVISION OF NORMAL PREGNANCY: Primary | ICD-10-CM

## 2019-09-05 PROCEDURE — 99207 ZZC NO CHARGE NURSE ONLY: CPT

## 2019-09-05 NOTE — PROGRESS NOTES
Patient attended [facility] visit for nurse intake. Prenatal book and folder (containing standard educational hand-outs and brochures) given to patient. Information in folder reviewed. Questions answered. Brochure given on optional screening available to assess chromosomal anomalies. Pt advised to call the clinic if she has any questions or concerns related to her pregnancy. Prenatal labs obtained. New prenatal visit scheduled on 9/11/9 with Dr Knutson.    25w2d    Lab Results   Component Value Date    PAP NIL, neg HPV 05/09/2019           Patient supplied answers from flow sheet for:  Prenatal OB Questionnaire.  Past Medical History  Diabetes?: No  Hypertension : No  Heart disease, mitral valve prolapse or rheumatic fever?: No  An autoimmune disease such as lupus or rheumatoid arthritis?: No  Kidney disease or urinary tract infection?: No  Epilepsy, seizures or spells?: No  Migraine headaches?: No  A stroke or loss of function or sensation?: No  Any other neurological problems?: No  Have you ever been treated for depression?: (!) Yes(took meds prior, it has been years)  Are you having problems with crying spells or loss of self-esteem?: No  Have you ever required psychiatric care?: No  Have you ever had hepatitis, liver disease or jaundice?: No  Have you been treated for blood clots in your veins, deep vein thromosis, inflammation in the veins, thrombosis, phlebitis, pulmonary embolism or varicosities?: No  Have you had excessive bleeding after surgery or dental work?: No  Do you bleed more than other women after a cut or scratch?: No  Do you have a history of anemia?: No  Have you ever had thyroid problems or taken thyroid medication?: No   Do you have any endocrine problems?: No  Have you ever been in a major accident or suffered serious trauma?: No  Within the last year, has anyone hit, slapped, kicked or otherwise hurt you?: No  In the last year, has anyone forced you to have sex when you didn't want to?: No    Past  Medical History 2   Have you ever received a blood transfusion?: No  Would you refuse a blood transfusion if a doctor judged it to be medically necessary?: No   If you answered Yes, would you rather die than receive a blood transfusion?: No  If you answered Yes, is this for Confucianist reasons?: No  Does anyone in your home smoke?: No  Do you use tobacco products?: (!) Yes(couple times per week)  Do you drink beer, wine or hard liquor?: No  Do you use any of the following: marijuana, speed, cocaine, heroin, hallucinogens or other drugs?: No   Is your blood type Rh negative?: No  Have you ever had abnormal antibodies in your blood?: No  Have you ever had asthma?: No  Have you ever had tuberculosis?: No  Do you have any allergies to drugs or over-the-counter medications?: No  Allergies: Dust Mites, Aspartame, Ethanol, Venlafaxine, Hydrochloride, Sertraline: No  Have you had any breast problems?: No  Have you ever ?: (!) Yes  Have you had any gynecological surgical procedures such as cervical conization, a LEEP procedure, laser treatment, cryosurgery of the cervix or a dilation and curettage, etc?: (!) Yes(LEEP 2009)  Have you ever had any other surgical procedures?: (!) Yes(abd hernia 1989)  Have you been hospitalized for a nonsurgical reason excluding normal delivery?: No  Have you ever had any anesthetic complications?: No  Have you ever had an abnormal pap smear?: (!) Yes(yes, had a colp and LEEP in 2009- nml paps since then)    Past Medical History (Continued)  Do you have a history of abnormalities of the uterus?: No  Did your mother take SOHEILA or any other hormones when she was pregnant with you?: No  Did it take you more than a year to become pregnant?: No  Have you ever been evaluated or treated for infertility?: No  Is there a history of medical problems in your family, which you feel may be important to this pregnancy?: No  Do you have any other problems we have not asked about which you feel may be  important to this pregnancy?: No    Symptoms since last menstrual period  Do you have any of the following symptoms: abdominal pain, blood in stools or urine, chest pain, shortness of breath, coughing or vomiting up blood, your heart racing or skipping beats, nausea and vomiting, pain on urination or vaginal discharge or bleed: No  Will the patient be 35 years old or older at the time of delivery?: No    Has the patient, baby's father or anyone in either family had:  Thalassemia (Italian, Greek, Mediterranean or  background only) and an MCV result less than 80?: No  Neural tube defect such as meningomyelocele, spina bifida or anencephaly?: No  Congenital heart defect?: No  Down's Syndrome?: (!) Yes(ot has son with down syndrome, different FOB)  Judson-Sachs disease (Church, Cajun, Yi-Lampe)?: No  Sickle cell disease or trait ()?: No  Hemophilia or other inherited problems of blood?: No  Muscular dystrophy?: No  Cystic fibrosis?: No  Isha's chorea?: No  Mental retardation/autism?: No  If yes, was the person tested for fragile X?: No  Any other inherited genetic or chromosomal disorder?: No  Maternal metabolic disorder (e.g Insulin-dependent diabetes, PKU)?: No  A child with birth defects not listed above?: No  Recurrent pregnancy loss or stillbirth?: No  Does the patient or baby's father have any other genetic risks?: (!) Yes(FOB has two brother with heart defect, records in care everywhere)    Infection History   Do you object to being tested for Hepatitis B?: No  Do you object to being tested for HIV?: No   Do you feel that you are at high risk for coming in contact with the AIDS virus?: No  Have you ever been treated for tuberculosis?: No  Have you ever had a positive skin test for tuberculosis?: No  Do you live with someone who has tuberculosis?: No  Have you ever been exposed to tuberculosis?: No  Do you have genital herpes?: No  Does your partner have genital herpes?: No  Have you had a  viral illness since your last period?: No  Have you ever had gonorrhea, chlamydia, syphilis, venereal warts, trichomoniasis, pelvic inflammatory disease or any other sexually transmitted disease?: No  Do you know if you are a genital group B streptococcus carrier?: No  Have you had chicken pox/varicella?: (!) Yes   Have you been vaccinated against chicken Pox?: No  Have you had any other infectious diseases?: No

## 2019-09-05 NOTE — TELEPHONE ENCOUNTER
Called pt, she will phone visit today rather than come to clinic.  I will call her at 1030.    Iwona CURRAN R.N.  [facility]

## 2019-09-11 ENCOUNTER — PRENATAL OFFICE VISIT (OUTPATIENT)
Dept: OBGYN | Facility: CLINIC | Age: 34
End: 2019-09-11
Payer: COMMERCIAL

## 2019-09-11 VITALS — BODY MASS INDEX: 28.7 KG/M2 | SYSTOLIC BLOOD PRESSURE: 102 MMHG | DIASTOLIC BLOOD PRESSURE: 52 MMHG | WEIGHT: 162 LBS

## 2019-09-11 DIAGNOSIS — Z67.91 RH NEGATIVE STATE IN ANTEPARTUM PERIOD: ICD-10-CM

## 2019-09-11 DIAGNOSIS — O99.330 TOBACCO SMOKING AFFECTING PREGNANCY, ANTEPARTUM: ICD-10-CM

## 2019-09-11 DIAGNOSIS — Z82.79 FAMILY HISTORY OF DOWNS SYNDROME: ICD-10-CM

## 2019-09-11 DIAGNOSIS — O26.899 RH NEGATIVE STATE IN ANTEPARTUM PERIOD: ICD-10-CM

## 2019-09-11 PROBLEM — Z36.89 ENCOUNTER FOR TRIAGE IN PREGNANT PATIENT: Status: RESOLVED | Noted: 2019-07-11 | Resolved: 2019-09-11

## 2019-09-11 PROCEDURE — 99207 ZZC COMPLICATED OB VISIT: CPT | Performed by: OBSTETRICS & GYNECOLOGY

## 2019-09-11 PROCEDURE — 36415 COLL VENOUS BLD VENIPUNCTURE: CPT | Performed by: OBSTETRICS & GYNECOLOGY

## 2019-09-11 PROCEDURE — 40000791 ZZHCL STATISTIC VERIFI PRENATAL TRISOMY 21,18,13: Mod: 90 | Performed by: OBSTETRICS & GYNECOLOGY

## 2019-09-11 PROCEDURE — 99000 SPECIMEN HANDLING OFFICE-LAB: CPT | Performed by: OBSTETRICS & GYNECOLOGY

## 2019-09-11 NOTE — NURSING NOTE
"Chief Complaint   Patient presents with     Prenatal Care     26 weeks 1 day- no concerns        Initial /52 (BP Location: Right arm, Patient Position: Sitting, Cuff Size: Adult Regular)   Wt 73.5 kg (162 lb)   LMP 2019   BMI 28.70 kg/m   Estimated body mass index is 28.7 kg/m  as calculated from the following:    Height as of 19: 1.6 m (5' 3\").    Weight as of this encounter: 73.5 kg (162 lb).  BP completed using cuff size: regular    Questioned patient about current smoking habits.  Pt. has never smoked.          The following HM Due: NONE    26w1d  Myke Mock CMA                "

## 2019-09-11 NOTE — PROGRESS NOTES
SUNDAY from [facility] in [place].  Reviewed chart from there.  Needs MFM U/S f/u for thickened nuchal fold on [facility] Level 2 U/S, Hx baby with Downs.  No c/o's.  When she met with [facility], she was offered  cfDNA, but she declined.  She has decided she wants to proceed with it now.  Will draw Innatal.  Also arrange f/u MFM U/S.  28 weeks labs, TDaP and Rhogam at next visit.   labor/Premature rupture of membranes precautions reviewed.  RTC 2 weeks.    Encounter Diagnoses   Name Primary?     Nuchal fold thickening determined by ultrasound Yes     Tobacco smoking affecting pregnancy, antepartum      Rh negative state in antepartum period      Family history of Downs syndrome        Risk factors listed above are stable and being addressed as noted.    Inocencio Knutson MD  [facility]

## 2019-09-12 DIAGNOSIS — O35.9XX0 SUSPECTED FETAL ABNORMALITY AFFECTING MANAGEMENT OF MOTHER, SINGLE OR UNSPECIFIED FETUS: Primary | ICD-10-CM

## 2019-09-16 ENCOUNTER — TELEPHONE (OUTPATIENT)
Dept: OBGYN | Facility: CLINIC | Age: 34
End: 2019-09-16

## 2019-09-16 LAB — LAB SCANNED RESULT: NORMAL

## 2019-09-16 NOTE — TELEPHONE ENCOUNTER
Results received from [facility] testing in [place] triage..  Testing done:  Innatal Prenatal Screen    Action:  Unable to lm for pt to cb to report NORMAL results.  Will try again later    Gender:  Will ask pt if they wish to know the gender.  Girl    Please route to provider as FYI once pt is informed.  Dina Presley RN

## 2019-09-18 NOTE — TELEPHONE ENCOUNTER
Attempted to call pt, but she did not answer and I was unable to leave a message.        Bettina Arnett RN

## 2019-09-25 ENCOUNTER — PRENATAL OFFICE VISIT (OUTPATIENT)
Dept: OBGYN | Facility: CLINIC | Age: 34
End: 2019-09-25
Payer: COMMERCIAL

## 2019-09-25 VITALS — SYSTOLIC BLOOD PRESSURE: 104 MMHG | DIASTOLIC BLOOD PRESSURE: 58 MMHG | WEIGHT: 163 LBS | BODY MASS INDEX: 28.87 KG/M2

## 2019-09-25 DIAGNOSIS — O99.330 TOBACCO SMOKING AFFECTING PREGNANCY, ANTEPARTUM: ICD-10-CM

## 2019-09-25 DIAGNOSIS — Z23 NEED FOR TDAP VACCINATION: ICD-10-CM

## 2019-09-25 DIAGNOSIS — O26.899 RH NEGATIVE STATE IN ANTEPARTUM PERIOD: ICD-10-CM

## 2019-09-25 DIAGNOSIS — Z67.91 RH NEGATIVE STATE IN ANTEPARTUM PERIOD: ICD-10-CM

## 2019-09-25 LAB
BLD GP AB SCN SERPL QL: NORMAL
ERYTHROCYTE [DISTWIDTH] IN BLOOD BY AUTOMATED COUNT: 14.2 % (ref 10–15)
HCT VFR BLD AUTO: 33.7 % (ref 35–47)
HGB BLD-MCNC: 11 G/DL (ref 11.7–15.7)
MCH RBC QN AUTO: 30 PG (ref 26.5–33)
MCHC RBC AUTO-ENTMCNC: 32.6 G/DL (ref 31.5–36.5)
MCV RBC AUTO: 92 FL (ref 78–100)
PLATELET # BLD AUTO: 205 10E9/L (ref 150–450)
RBC # BLD AUTO: 3.67 10E12/L (ref 3.8–5.2)
WBC # BLD AUTO: 17.8 10E9/L (ref 4–11)

## 2019-09-25 PROCEDURE — 96372 THER/PROPH/DIAG INJ SC/IM: CPT | Performed by: OBSTETRICS & GYNECOLOGY

## 2019-09-25 PROCEDURE — 86780 TREPONEMA PALLIDUM: CPT | Performed by: OBSTETRICS & GYNECOLOGY

## 2019-09-25 PROCEDURE — 82950 GLUCOSE TEST: CPT | Performed by: OBSTETRICS & GYNECOLOGY

## 2019-09-25 PROCEDURE — 36415 COLL VENOUS BLD VENIPUNCTURE: CPT | Performed by: OBSTETRICS & GYNECOLOGY

## 2019-09-25 PROCEDURE — 99207 ZZC COMPLICATED OB VISIT: CPT | Performed by: OBSTETRICS & GYNECOLOGY

## 2019-09-25 PROCEDURE — 85027 COMPLETE CBC AUTOMATED: CPT | Performed by: OBSTETRICS & GYNECOLOGY

## 2019-09-25 PROCEDURE — 86850 RBC ANTIBODY SCREEN: CPT | Performed by: OBSTETRICS & GYNECOLOGY

## 2019-09-25 PROCEDURE — 90471 IMMUNIZATION ADMIN: CPT | Performed by: OBSTETRICS & GYNECOLOGY

## 2019-09-25 PROCEDURE — 90715 TDAP VACCINE 7 YRS/> IM: CPT | Performed by: OBSTETRICS & GYNECOLOGY

## 2019-09-25 NOTE — PROGRESS NOTES
Clinic Administered Medication Documentation    Rhogam  Lot # [id number]  Exp: 21  Lindsay Argueta, RODRIGO          No c/o's.  28 week labs, TDaP, Rhogam today.  Has f/u MFM U/S for EFW next week.   labor/Premature rupture of membranes precautions reviewed.  RTC 4 weeks.    Encounter Diagnoses   Name Primary?     Nuchal fold thickening determined by ultrasound Yes     Tobacco smoking affecting pregnancy, antepartum      Rh negative state in antepartum period      Need for Tdap vaccination        Risk factors listed above are stable and being addressed as noted.    Inocencio Knutson MD  [facility]

## 2019-09-25 NOTE — NURSING NOTE
"Chief Complaint   Patient presents with     Prenatal Care       Initial /58 (BP Location: Right arm, Cuff Size: Adult Regular)   Wt 73.9 kg (163 lb)   LMP 2019 (Exact Date)   BMI 28.87 kg/m   Estimated body mass index is 28.87 kg/m  as calculated from the following:    Height as of 19: 1.6 m (5' 3\").    Weight as of this encounter: 73.9 kg (163 lb).  BP completed using cuff size: regular    Questioned patient about current smoking habits.  Pt. currently smokes.  Advised about smoking cessation.            Wesley Macias, [facility]               "

## 2019-09-26 LAB
GLUCOSE 1H P 50 G GLC PO SERPL-MCNC: 160 MG/DL (ref 60–129)
T PALLIDUM AB SER QL: NONREACTIVE

## 2019-09-27 DIAGNOSIS — R73.09 ABNORMAL GLUCOSE TOLERANCE TEST: Primary | ICD-10-CM

## 2019-09-29 ENCOUNTER — HEALTH MAINTENANCE LETTER (OUTPATIENT)
Age: 34
End: 2019-09-29

## 2019-10-03 DIAGNOSIS — R73.09 ABNORMAL GLUCOSE TOLERANCE TEST: ICD-10-CM

## 2019-10-03 PROCEDURE — 82952 GTT-ADDED SAMPLES: CPT | Performed by: OBSTETRICS & GYNECOLOGY

## 2019-10-03 PROCEDURE — 82951 GLUCOSE TOLERANCE TEST (GTT): CPT | Performed by: OBSTETRICS & GYNECOLOGY

## 2019-10-03 PROCEDURE — 36415 COLL VENOUS BLD VENIPUNCTURE: CPT | Performed by: OBSTETRICS & GYNECOLOGY

## 2019-10-04 ENCOUNTER — HOSPITAL ENCOUNTER (OUTPATIENT)
Dept: ULTRASOUND IMAGING | Facility: CLINIC | Age: 34
Discharge: HOME | End: 2019-10-04
Attending: OBSTETRICS & GYNECOLOGY | Admitting: OBSTETRICS & GYNECOLOGY
Payer: COMMERCIAL

## 2019-10-04 ENCOUNTER — OFFICE VISIT (OUTPATIENT)
Dept: MATERNAL FETAL MEDICINE | Facility: CLINIC | Age: 34
End: 2019-10-04
Attending: OBSTETRICS & GYNECOLOGY
Payer: COMMERCIAL

## 2019-10-04 DIAGNOSIS — O26.13 POOR WEIGHT GAIN OF PREGNANCY, THIRD TRIMESTER: Primary | ICD-10-CM

## 2019-10-04 DIAGNOSIS — O40.9XX0 AFI (AMNIOTIC FLUID INDEX) INCREASED: ICD-10-CM

## 2019-10-04 DIAGNOSIS — O35.9XX0 SUSPECTED FETAL ABNORMALITY AFFECTING MANAGEMENT OF MOTHER, SINGLE OR UNSPECIFIED FETUS: ICD-10-CM

## 2019-10-04 PROBLEM — O40.3XX0 POLYHYDRAMNIOS IN THIRD TRIMESTER: Status: ACTIVE | Noted: 2019-10-04

## 2019-10-04 LAB
GLUCOSE 1H P 100 G GLC PO SERPL-MCNC: 185 MG/DL (ref 60–179)
GLUCOSE 2H P 100 G GLC PO SERPL-MCNC: 123 MG/DL (ref 60–154)
GLUCOSE 3H P 100 G GLC PO SERPL-MCNC: 88 MG/DL (ref 60–139)
GLUCOSE P FAST SERPL-MCNC: 84 MG/DL (ref 60–94)

## 2019-10-04 PROCEDURE — 76811 OB US DETAILED SNGL FETUS: CPT

## 2019-10-04 NOTE — PROGRESS NOTES
"Please see \"Imaging\" tab under \"Chart Review\" for details of today's US at the [facility].    David Barillas MD  Maternal-Fetal Medicine    "

## 2019-10-16 ENCOUNTER — PRENATAL OFFICE VISIT (OUTPATIENT)
Dept: OBGYN | Facility: CLINIC | Age: 34
End: 2019-10-16
Payer: COMMERCIAL

## 2019-10-16 VITALS — BODY MASS INDEX: 28.7 KG/M2 | DIASTOLIC BLOOD PRESSURE: 58 MMHG | WEIGHT: 162 LBS | SYSTOLIC BLOOD PRESSURE: 104 MMHG

## 2019-10-16 DIAGNOSIS — O99.330 TOBACCO SMOKING AFFECTING PREGNANCY, ANTEPARTUM: ICD-10-CM

## 2019-10-16 DIAGNOSIS — O40.3XX0 POLYHYDRAMNIOS IN THIRD TRIMESTER COMPLICATION, SINGLE OR UNSPECIFIED FETUS: ICD-10-CM

## 2019-10-16 DIAGNOSIS — Z67.91 RH NEGATIVE STATE IN ANTEPARTUM PERIOD: Primary | ICD-10-CM

## 2019-10-16 DIAGNOSIS — O26.899 RH NEGATIVE STATE IN ANTEPARTUM PERIOD: Primary | ICD-10-CM

## 2019-10-16 DIAGNOSIS — R35.0 URINARY FREQUENCY: ICD-10-CM

## 2019-10-16 LAB
ALBUMIN UR-MCNC: NEGATIVE MG/DL
APPEARANCE UR: ABNORMAL
BILIRUB UR QL STRIP: NEGATIVE
COLOR UR AUTO: YELLOW
GLUCOSE UR STRIP-MCNC: NEGATIVE MG/DL
HGB UR QL STRIP: NEGATIVE
KETONES UR STRIP-MCNC: NEGATIVE MG/DL
LEUKOCYTE ESTERASE UR QL STRIP: NEGATIVE
NITRATE UR QL: NEGATIVE
NON-SQ EPI CELLS #/AREA URNS LPF: ABNORMAL /LPF
PH UR STRIP: 7.5 PH (ref 5–7)
RBC #/AREA URNS AUTO: ABNORMAL /HPF
SOURCE: ABNORMAL
SP GR UR STRIP: 1.02 (ref 1–1.03)
UROBILINOGEN UR STRIP-ACNC: 0.2 EU/DL (ref 0.2–1)
WBC #/AREA URNS AUTO: ABNORMAL /HPF

## 2019-10-16 PROCEDURE — 99207 ZZC COMPLICATED OB VISIT: CPT | Performed by: OBSTETRICS & GYNECOLOGY

## 2019-10-16 PROCEDURE — 81001 URINALYSIS AUTO W/SCOPE: CPT | Performed by: OBSTETRICS & GYNECOLOGY

## 2019-10-16 NOTE — PROGRESS NOTES
Pt c/o urinary frequency, urgency, no dysuria.  UA sent.  Had MFM U/S at 28 weeks showing 1640 g (92%), JAYDA 23 (Poly).  Has f/u MFM U/S next week.  Fetal movement counts BID,  labor/premature rupture of membranes precautions reviewed.  RTC 2 week(s).    Encounter Diagnoses   Name Primary?     Polyhydramnios in third trimester complication, single or unspecified fetus      Nuchal fold thickening determined by ultrasound      Tobacco smoking affecting pregnancy, antepartum      Rh negative state in antepartum period Yes     Urinary frequency        Risk factors listed above are stable and being addressed as noted.    Inocencio Knutson MD  [facility]

## 2019-10-16 NOTE — NURSING NOTE
"Chief Complaint   Patient presents with     Prenatal Care     30 weeks 3 days- dysuria        Initial /58 (BP Location: Right arm, Patient Position: Sitting, Cuff Size: Adult Regular)   Wt 73.5 kg (162 lb)   LMP 2019 (Exact Date)   BMI 28.70 kg/m   Estimated body mass index is 28.7 kg/m  as calculated from the following:    Height as of 19: 1.6 m (5' 3\").    Weight as of this encounter: 73.5 kg (162 lb).  BP completed using cuff size: regular    Questioned patient about current smoking habits.  Pt. has never smoked.          The following HM Due: NONE    30w3d  Myke Mock CMA                "

## 2019-10-22 ENCOUNTER — OFFICE VISIT (OUTPATIENT)
Dept: MATERNAL FETAL MEDICINE | Facility: CLINIC | Age: 34
End: 2019-10-22
Attending: OBSTETRICS & GYNECOLOGY
Payer: COMMERCIAL

## 2019-10-22 ENCOUNTER — HOSPITAL ENCOUNTER (OUTPATIENT)
Dept: ULTRASOUND IMAGING | Facility: CLINIC | Age: 34
Discharge: HOME | End: 2019-10-22
Attending: OBSTETRICS & GYNECOLOGY | Admitting: OBSTETRICS & GYNECOLOGY
Payer: COMMERCIAL

## 2019-10-22 DIAGNOSIS — O40.9XX0 AFI (AMNIOTIC FLUID INDEX) INCREASED: ICD-10-CM

## 2019-10-22 DIAGNOSIS — O36.60X0 EXCESSIVE FETAL GROWTH AFFECTING MANAGEMENT OF PREGNANCY, ANTEPARTUM, SINGLE OR UNSPECIFIED FETUS: Primary | ICD-10-CM

## 2019-10-22 PROCEDURE — 76815 OB US LIMITED FETUS(S): CPT

## 2019-10-22 NOTE — PROGRESS NOTES
Please refer to ultrasound report under 'Imaging' Studies of 'Chart Review' tabs.    Pritesh Ch M.D.

## 2019-10-25 ENCOUNTER — PRENATAL OFFICE VISIT (OUTPATIENT)
Dept: OBGYN | Facility: CLINIC | Age: 34
End: 2019-10-25
Payer: COMMERCIAL

## 2019-10-25 VITALS — WEIGHT: 163 LBS | BODY MASS INDEX: 28.87 KG/M2 | DIASTOLIC BLOOD PRESSURE: 58 MMHG | SYSTOLIC BLOOD PRESSURE: 104 MMHG

## 2019-10-25 DIAGNOSIS — O99.330 TOBACCO SMOKING AFFECTING PREGNANCY, ANTEPARTUM: ICD-10-CM

## 2019-10-25 DIAGNOSIS — O26.899 RH NEGATIVE STATE IN ANTEPARTUM PERIOD: ICD-10-CM

## 2019-10-25 DIAGNOSIS — Z67.91 RH NEGATIVE STATE IN ANTEPARTUM PERIOD: ICD-10-CM

## 2019-10-25 PROCEDURE — 99207 ZZC COMPLICATED OB VISIT: CPT | Performed by: OBSTETRICS & GYNECOLOGY

## 2019-10-25 NOTE — NURSING NOTE
"Chief Complaint   Patient presents with     Prenatal Care     31 weeks 5 days- no concerns        Initial /58 (BP Location: Right arm, Patient Position: Sitting, Cuff Size: Adult Regular)   Wt 73.9 kg (163 lb)   LMP 2019 (Exact Date)   BMI 28.87 kg/m   Estimated body mass index is 28.87 kg/m  as calculated from the following:    Height as of 19: 1.6 m (5' 3\").    Weight as of this encounter: 73.9 kg (163 lb).  BP completed using cuff size: regular    Questioned patient about current smoking habits.  Pt. has never smoked.          The following HM Due: NONE    Myke Mock CMA                "

## 2019-10-25 NOTE — PROGRESS NOTES
No c/o's.  Last MFM U/S showed Poly has resolved.  Has another growth U/S w/ MDM in 2 weeks.  Advised to quit smoking again.  Fetal movement counts BID,  labor/premature rupture of membranes precautions reviewed.  RTC 2 week(s).    Encounter Diagnoses   Name Primary?     Nuchal fold thickening determined by ultrasound Yes     Tobacco smoking affecting pregnancy, antepartum      Rh negative state in antepartum period        Risk factors listed above are stable and being addressed as noted.    Inocencio Knutson MD  [facility]

## 2019-10-31 ENCOUNTER — HOSPITAL ENCOUNTER (OUTPATIENT)
Facility: CLINIC | Age: 34
Setting detail: OBSERVATION
Discharge: HOME | End: 2019-11-01
Attending: OBSTETRICS & GYNECOLOGY | Admitting: OBSTETRICS & GYNECOLOGY
Payer: COMMERCIAL

## 2019-10-31 PROBLEM — O47.03 PRETERM UTERINE CONTRACTIONS IN THIRD TRIMESTER, ANTEPARTUM: Status: ACTIVE | Noted: 2019-10-31

## 2019-10-31 PROBLEM — R87.89 POSITIVE FETAL FIBRONECTIN AT 22 WEEKS TO 34 WEEKS GESTATION: Status: ACTIVE | Noted: 2019-10-31

## 2019-10-31 PROBLEM — O40.3XX0 POLYHYDRAMNIOS IN THIRD TRIMESTER: Status: RESOLVED | Noted: 2019-10-04 | Resolved: 2019-10-31

## 2019-10-31 PROBLEM — Z36.89 ENCOUNTER FOR TRIAGE IN PREGNANT PATIENT: Status: ACTIVE | Noted: 2019-10-31

## 2019-10-31 PROBLEM — O09.899 POSITIVE FETAL FIBRONECTIN AT 22 WEEKS TO 34 WEEKS GESTATION: Status: ACTIVE | Noted: 2019-10-31

## 2019-10-31 LAB
ABO + RH BLD: ABNORMAL
ABO + RH BLD: ABNORMAL
ALBUMIN UR-MCNC: 10 MG/DL
AMPHETAMINES UR QL SCN: NEGATIVE
APPEARANCE UR: CLEAR
BILIRUB UR QL STRIP: NEGATIVE
BLD GP AB INVEST PLASRBC-IMP: ABNORMAL
BLD GP AB SCN SERPL QL: ABNORMAL
BLOOD BANK CMNT PATIENT-IMP: ABNORMAL
CANNABINOIDS UR QL: NEGATIVE
COCAINE UR QL: NEGATIVE
COLOR UR AUTO: YELLOW
FIBRONECTIN FETAL VAG QL: POSITIVE
GLUCOSE UR STRIP-MCNC: NEGATIVE MG/DL
HGB BLD-MCNC: 12.2 G/DL (ref 11.7–15.7)
HGB UR QL STRIP: NEGATIVE
KETONES UR STRIP-MCNC: 10 MG/DL
LEUKOCYTE ESTERASE UR QL STRIP: NEGATIVE
MUCOUS THREADS #/AREA URNS LPF: PRESENT /LPF
NITRATE UR QL: NEGATIVE
OPIATES UR QL SCN: NEGATIVE
PCP UR QL SCN: NEGATIVE
PH UR STRIP: 6.5 PH (ref 5–7)
RBC #/AREA URNS AUTO: 1 /HPF (ref 0–2)
SOURCE: ABNORMAL
SP GR UR STRIP: 1.02 (ref 1–1.03)
SPECIMEN EXP DATE BLD: ABNORMAL
SQUAMOUS #/AREA URNS AUTO: 1 /HPF (ref 0–1)
UROBILINOGEN UR STRIP-MCNC: NORMAL MG/DL (ref 0–2)
WBC #/AREA URNS AUTO: 1 /HPF (ref 0–5)

## 2019-10-31 PROCEDURE — 86901 BLOOD TYPING SEROLOGIC RH(D): CPT | Performed by: OBSTETRICS & GYNECOLOGY

## 2019-10-31 PROCEDURE — 85018 HEMOGLOBIN: CPT | Performed by: OBSTETRICS & GYNECOLOGY

## 2019-10-31 PROCEDURE — G0378 HOSPITAL OBSERVATION PER HR: HCPCS

## 2019-10-31 PROCEDURE — 99214 OFFICE O/P EST MOD 30 MIN: CPT | Performed by: OBSTETRICS & GYNECOLOGY

## 2019-10-31 PROCEDURE — 25000132 ZZH RX MED GY IP 250 OP 250 PS 637: Performed by: OBSTETRICS & GYNECOLOGY

## 2019-10-31 PROCEDURE — 86870 RBC ANTIBODY IDENTIFICATION: CPT | Performed by: OBSTETRICS & GYNECOLOGY

## 2019-10-31 PROCEDURE — 86850 RBC ANTIBODY SCREEN: CPT | Performed by: OBSTETRICS & GYNECOLOGY

## 2019-10-31 PROCEDURE — 80307 DRUG TEST PRSMV CHEM ANLYZR: CPT | Performed by: OBSTETRICS & GYNECOLOGY

## 2019-10-31 PROCEDURE — 87653 STREP B DNA AMP PROBE: CPT | Performed by: OBSTETRICS & GYNECOLOGY

## 2019-10-31 PROCEDURE — 96360 HYDRATION IV INFUSION INIT: CPT

## 2019-10-31 PROCEDURE — 96372 THER/PROPH/DIAG INJ SC/IM: CPT

## 2019-10-31 PROCEDURE — G0463 HOSPITAL OUTPT CLINIC VISIT: HCPCS | Mod: 25

## 2019-10-31 PROCEDURE — 25800030 ZZH RX IP 258 OP 636: Performed by: OBSTETRICS & GYNECOLOGY

## 2019-10-31 PROCEDURE — 87186 SC STD MICRODIL/AGAR DIL: CPT | Performed by: OBSTETRICS & GYNECOLOGY

## 2019-10-31 PROCEDURE — 86900 BLOOD TYPING SEROLOGIC ABO: CPT | Performed by: OBSTETRICS & GYNECOLOGY

## 2019-10-31 PROCEDURE — 96374 THER/PROPH/DIAG INJ IV PUSH: CPT

## 2019-10-31 PROCEDURE — 25000128 H RX IP 250 OP 636: Performed by: OBSTETRICS & GYNECOLOGY

## 2019-10-31 PROCEDURE — 81001 URINALYSIS AUTO W/SCOPE: CPT | Performed by: OBSTETRICS & GYNECOLOGY

## 2019-10-31 PROCEDURE — 96361 HYDRATE IV INFUSION ADD-ON: CPT

## 2019-10-31 PROCEDURE — 82731 ASSAY OF FETAL FIBRONECTIN: CPT | Performed by: OBSTETRICS & GYNECOLOGY

## 2019-10-31 RX ORDER — NIFEDIPINE 10 MG/1
10 CAPSULE ORAL EVERY 6 HOURS PRN
Status: DISCONTINUED | OUTPATIENT
Start: 2019-10-31 | End: 2019-11-01 | Stop reason: HOSPADM

## 2019-10-31 RX ORDER — NIFEDIPINE 10 MG/1
10 CAPSULE ORAL ONCE
Status: COMPLETED | OUTPATIENT
Start: 2019-10-31 | End: 2019-10-31

## 2019-10-31 RX ORDER — NIFEDIPINE 10 MG/1
20 CAPSULE ORAL ONCE
Status: DISCONTINUED | OUTPATIENT
Start: 2019-10-31 | End: 2019-10-31

## 2019-10-31 RX ORDER — LIDOCAINE 40 MG/G
CREAM TOPICAL
Status: DISCONTINUED | OUTPATIENT
Start: 2019-10-31 | End: 2019-11-01 | Stop reason: HOSPADM

## 2019-10-31 RX ORDER — BETAMETHASONE SODIUM PHOSPHATE AND BETAMETHASONE ACETATE 3; 3 MG/ML; MG/ML
12 INJECTION, SUSPENSION INTRA-ARTICULAR; INTRALESIONAL; INTRAMUSCULAR; SOFT TISSUE EVERY 24 HOURS
Status: COMPLETED | OUTPATIENT
Start: 2019-10-31 | End: 2019-11-01

## 2019-10-31 RX ORDER — SODIUM CHLORIDE, SODIUM LACTATE, POTASSIUM CHLORIDE, CALCIUM CHLORIDE 600; 310; 30; 20 MG/100ML; MG/100ML; MG/100ML; MG/100ML
INJECTION, SOLUTION INTRAVENOUS CONTINUOUS
Status: DISCONTINUED | OUTPATIENT
Start: 2019-10-31 | End: 2019-11-01 | Stop reason: HOSPADM

## 2019-10-31 RX ORDER — CALCIUM CARBONATE 500 MG/1
1000 TABLET, CHEWABLE ORAL EVERY 4 HOURS PRN
Status: DISCONTINUED | OUTPATIENT
Start: 2019-10-31 | End: 2019-11-01 | Stop reason: HOSPADM

## 2019-10-31 RX ADMIN — CALCIUM CARBONATE (ANTACID) CHEW TAB 500 MG 1000 MG: 500 CHEW TAB at 06:26

## 2019-10-31 RX ADMIN — BETAMETHASONE SODIUM PHOSPHATE AND BETAMETHASONE ACETATE 12 MG: 3; 3 INJECTION, SUSPENSION INTRA-ARTICULAR; INTRALESIONAL; INTRAMUSCULAR at 06:27

## 2019-10-31 RX ADMIN — CALCIUM CARBONATE (ANTACID) CHEW TAB 500 MG 1000 MG: 500 CHEW TAB at 10:26

## 2019-10-31 RX ADMIN — SODIUM CHLORIDE, POTASSIUM CHLORIDE, SODIUM LACTATE AND CALCIUM CHLORIDE 1000 ML: 600; 310; 30; 20 INJECTION, SOLUTION INTRAVENOUS at 04:53

## 2019-10-31 RX ADMIN — SODIUM CHLORIDE, POTASSIUM CHLORIDE, SODIUM LACTATE AND CALCIUM CHLORIDE 250 ML: 600; 310; 30; 20 INJECTION, SOLUTION INTRAVENOUS at 10:43

## 2019-10-31 RX ADMIN — NIFEDIPINE 10 MG: 10 CAPSULE ORAL at 18:24

## 2019-10-31 RX ADMIN — CALCIUM CARBONATE (ANTACID) CHEW TAB 500 MG 1000 MG: 500 CHEW TAB at 16:02

## 2019-10-31 RX ADMIN — NIFEDIPINE 10 MG: 10 CAPSULE ORAL at 06:25

## 2019-10-31 RX ADMIN — SODIUM CHLORIDE, POTASSIUM CHLORIDE, SODIUM LACTATE AND CALCIUM CHLORIDE: 600; 310; 30; 20 INJECTION, SOLUTION INTRAVENOUS at 16:50

## 2019-10-31 RX ADMIN — SODIUM CHLORIDE, POTASSIUM CHLORIDE, SODIUM LACTATE AND CALCIUM CHLORIDE: 600; 310; 30; 20 INJECTION, SOLUTION INTRAVENOUS at 11:49

## 2019-10-31 RX ADMIN — NIFEDIPINE 10 MG: 10 CAPSULE ORAL at 11:40

## 2019-10-31 ASSESSMENT — MIFFLIN-ST. JEOR: SCORE: 1404.89

## 2019-10-31 NOTE — PROVIDER NOTIFICATION
10/31/19 0550   Provider Notification   Provider Name/Title Dr. Knutson   Method of Notification At Bedside   MD in unit. Informed of positive FFN results. UA sent, in process. IV fluid bolus initiated. MD assessed cervix, SVE per MD 1/50/-3. VORB for beta, nidedipine 10 mg, type and screen, hemoglobin, GBS swab. Fluid can infuse at 125 ml/hour after bolus is completed. MD ok with TUMS for patient comfort. Pt understanding of POC and all questions addressed.

## 2019-10-31 NOTE — PLAN OF CARE
/ toxicity screening policy explained to patient including factors in history that require testing. Pt verbalized understanding and consents to testing on urine.

## 2019-10-31 NOTE — PLAN OF CARE
Data: Patient presented to [facility]: 10/31/2019  3:30 AM.  Reason for maternal/fetal assessment is abdominal pressure that comes and goes and back pain that is always there. Patient reports pains have been going on since this am but got worse when she went to lay down and go to sleep. Back pain is a 7/10 and abdominal pain is 6/10.  Patient is a .  Prenatal record reviewed. Pregnancy  has been complicated by has been uncomplicated.  Gestational Age 32w4d. VSS. Fetal movement present. Patient denies leaking of vaginal fluid/rupture of membranes, vaginal bleeding, nausea, vomiting, headache, epigastric or URQ pain, significant edema. Support person is present.   Action: Verbal consent for EFM. Triage assessment completed. Bill of rights reviewed.  Response: Patient verbalized agreement with plan. Will contact Dr Inocencio Knutson with update and further orders.

## 2019-10-31 NOTE — H&P
"  2019    Karely Munoz  [id number]            OB History & Physical      Ms. Munoz  is here for increasing UC's and back pain.    She has had worsening UC's and back pain since yesterday.  No VB, SROM.  Fetus active.    Patient's last menstrual period was 2019 (exact date).   Her Estimated Date of Delivery: Dec 22, 2019, making her 32w4d.      Estimated body mass index is 28.34 kg/m  as calculated from the following:    Height as of this encounter: 1.6 m (5' 3\").    Weight as of this encounter: 72.6 kg (160 lb).  Her prenatal course has been w/ me after a transfer from  complicated by Hx 2nd baby w/ Downs, this preg w/ thick nuchal fold o/w normal Lvl 2 U/S, Tobacco use.    See prenatal for labs.  Unknown GBS, Rubella Immune, RH Negative         She is a 33 year old   Her OB history:   OB History    Para Term  AB Living   4 3 3 0 0 3   SAB TAB Ectopic Multiple Live Births   0 0 0 0 3      # Outcome Date GA Lbr Roberto/2nd Weight Sex Delivery Anes PTL Lv   4 Current            3 Term 09 38w0d  3.232 kg (7 lb 2 oz) F Vag-Spont None  MERCEDES   2 Term 07 38w0d  2.906 kg (6 lb 6.5 oz) M Vag-Spont None  MERCDEES   1 Term 05 38w2d  3.374 kg (7 lb 7 oz) F Vag-Spont EPI  MERCEDES            Past Medical History:   Diagnosis Date     Depressive disorder     depression/anxiety     Kidney stone           Past Surgical History:   Procedure Laterality Date     ENT SURGERY Bilateral     Ear tubes     HERNIA REPAIR Left     Inguinal         No current outpatient medications on file.       Allergies: Patient has no known allergies.      REVIEW OF SYSTEMS:  NEUROLOGIC:  Negative  EYES:  Negative  ENT:  Negative  GI:  Negative  :  Negative  GYN:  Negative  CV:  Negative  PULMONARY:  Negative  MUSCULOSKELETAL:  Negative  PSYCH:  Negative        Social History     Socioeconomic History     Marital status:      Spouse name: Not on file     Number of children: Not on file     Years of " "education: Not on file     Highest education level: Not on file   Occupational History     Not on file   Social Needs     Financial resource strain: Not on file     Food insecurity:     Worry: Not on file     Inability: Not on file     Transportation needs:     Medical: Not on file     Non-medical: Not on file   Tobacco Use     Smoking status: Current Every Day Smoker     Packs/day: 0.25     Smokeless tobacco: Never Used   Substance and Sexual Activity     Alcohol use: Not Currently     Drug use: No     Sexual activity: Yes     Partners: Male     Birth control/protection: None   Lifestyle     Physical activity:     Days per week: Not on file     Minutes per session: Not on file     Stress: Not on file   Relationships     Social connections:     Talks on phone: Not on file     Gets together: Not on file     Attends [employer] service: Not on file     Active member of club or organization: Not on file     Attends meetings of clubs or organizations: Not on file     Relationship status: Not on file     Intimate partner violence:     Fear of current or ex partner: Not on file     Emotionally abused: Not on file     Physically abused: Not on file     Forced sexual activity: Not on file   Other Topics Concern     Parent/sibling w/ CABG, MI or angioplasty before 65F 55M? Not Asked   Social History Narrative     Not on file      Family History   Problem Relation Age of Onset     Hearing Loss Mother          Exam:    Vitals:   BP 94/51   Pulse 96   Temp 97.8  F (36.6  C) (Oral)   Resp 16   Ht 1.6 m (5' 3\")   Wt 72.6 kg (160 lb)   LMP 03/17/2019 (Exact Date)   BMI 28.34 kg/m    FHT: Reactive, category 1    Chamberlain:  Mild contractions every 4-5 min    Gen- Alert Awake in NAD  HEENT grossly normal  Neck: no lymphadenopathy or thryoidomegaly  Lungs CTAB  Back No CVAT  Heart RR  ABD gravid, NABS, soft, NT on exam with NT fundus palpable  Cervix is 1 cm / 50 % effaced at -5 station/Vtx (confirmed by bedside U/S)  EXT:  No edema, " no calf tenderness    Labs:  Results for orders placed or performed during the hospital encounter of 10/31/19   Fetal fibronectin   Result Value Ref Range    Fetal Fibronectin Positive        Assessment:    1)  IUP at 32w4d  2)   UC's w/ Cx dilation and Pos FFN - will Rx for now as possible early PTL.  At least two prior pregnancies did require tocolysis and 1 required BMZ per Pt Hx.  She ultimately delivered all babies at term.  3)  Unknown GBS  4)  Rh Neg s/p Rhogam at 28 weeks      Plan:    1)  Admit for observation  2)  BMZ 12 mg now.  Repeat in 24 hours, possibly in clinic if able to go home later today.  3)  Nifedipine 10 mg po now.  Due to baseline BP being slightly low, will use lower dose to start.  4)  GBS Cx.  5)  When able to be d/c'd will have Pt remain on modified bedrest until at least 34 weeks.  Has appt  with me and MFM for U/S.      Inocencio Knutson MD  2019

## 2019-10-31 NOTE — PROVIDER NOTIFICATION
10/31/19 1004   Provider Notification   Provider Name/Title Dr Dunn   Method of Notification At Bedside   Request Evaluate in Person   Notification Reason Uterine Activity;Pain;Status Update;SVE   Dr Dunn bedside. SVE done no change noted. Pt continues to have contractions,able to sleep through some of them. Plan to stay for observation through this afternoon and possibly overnight. MD to reevaluate this afternoon.

## 2019-10-31 NOTE — PROVIDER NOTIFICATION
10/31/19 0417   Provider Notification   Provider Name/Title DR. COELLO   Method of Notification Phone   Request Evaluate - Remote   Notification Reason Patient Arrived;Status Update     Dr. Coello called and updated that patient came in with back pain and abdominal pressure. Patient has had 2 contractions since 0345 with one variable noted lasting 15 seconds with FHT baseline 120 with mary down to 80-90. Orders received to FFN and SVE and a LR bolus. Oncoming nurse given report and will assume cares.

## 2019-10-31 NOTE — PROVIDER NOTIFICATION
10/31/19 0505   Provider Notification   Provider Name/Title Dr. Knutson   Method of Notification At Bedside   MD at bedside. Contraction pattern, FHR tracing, pt pain discussed. FFN collected and in process. SVE per writer 1/70/-3. MD will be on unit. VORB to collect UA. Will continue to monitor and reassess when FFN results back.

## 2019-10-31 NOTE — PROVIDER NOTIFICATION
10/31/19 0719   Provider Notification   Provider Name/Title Dr Dunn    Method of Notification At Bedside   Request Evaluate in Person   Notification Reason Status Update;Uterine Activity   Dr Durán at the bedside to assess the patient. Dr is ok to saline lock IV. The plan is to watch patient until 9am and recheck the cervix if not change patient may be discharged to home to follow up in the clinic for the second Betamethasone at 8 am, this may be a nurses visit.

## 2019-10-31 NOTE — PROVIDER NOTIFICATION
10/31/19 1330   Provider Notification   Provider Name/Title Dr Dunn   Method of Notification Phone   Request Evaluate - Remote   Notification Reason Uterine Activity;Pain;Status Update   MD updated, patient still contraction post nifedipine dose every 2-5. Patient is reporting more 'lightening crotch', still feels contractions but is able to rest. Tolerated lunch. FHTs WNL, baseline heart rate change to 150 for about 30 minutes, arrhythmia noted on ultrasound during that time--has now since resolved. Maternal BPs WNL. TORB patient TSON through next betamethasone dose. Will continue to monitor and update as needed.

## 2019-10-31 NOTE — PROVIDER NOTIFICATION
10/31/19 1103   Provider Notification   Provider Name/Title Dr Dunn   Method of Notification Phone   Request Evaluate - Remote   Notification Reason Uterine Activity;Pain;Status Update   Updated on increase in contraction frequency and intensity. Ordered to give IV fluid bolus and continuous. Also for Nifedipine now and every 6 hours PRN.

## 2019-10-31 NOTE — PROVIDER NOTIFICATION
10/31/19 0612   Provider Notification   Provider Name/Title Dr. Knutosn   Method of Notification At Bedside   MD at bedside for US and confirmation of fetal position. Vertex per MD.

## 2019-10-31 NOTE — PROGRESS NOTES
This patient was stable after admission for several hours, and then began Having contractions every 2 to 3 minutes which were stronger in intensity than contractions she had earlier.  Prior to feeling the contractions increase in intensity, I did recheck her cervix and no change was noted.    She was given another dose of nifedipine 10 mg.  Again, 10 mg was used rather than 20 mg due to her low normal blood pressures.  This is so far resulted in a decrease in intensity of contractions, though the uterus remains irritable.  I have not rechecked her cervix.    Due to the need for repeat dose of nifedipine, and the low likelihood that this will stop completely or that she would be able to manage it at home, I think it is prudent to keep her hospitalized at least until the second dose of betamethasone is complete tomorrow morning.  At that point, she can be reassessed for discharge to home with outpatient follow-up.  She is in agreement with this plan.  I have currently ordered nifedipine 10 mg p.o. every 6 hours as needed for contractions more frequent than 6 times per hour.    Brittney Dunn MD

## 2019-11-01 VITALS
SYSTOLIC BLOOD PRESSURE: 109 MMHG | WEIGHT: 160 LBS | BODY MASS INDEX: 28.35 KG/M2 | TEMPERATURE: 97.8 F | RESPIRATION RATE: 16 BRPM | HEIGHT: 63 IN | HEART RATE: 88 BPM | DIASTOLIC BLOOD PRESSURE: 70 MMHG

## 2019-11-01 PROBLEM — Z36.89 ENCOUNTER FOR TRIAGE IN PREGNANT PATIENT: Status: RESOLVED | Noted: 2019-10-31 | Resolved: 2019-11-01

## 2019-11-01 PROBLEM — O99.820 GBS (GROUP B STREPTOCOCCUS CARRIER), +RV CULTURE, CURRENTLY PREGNANT: Status: ACTIVE | Noted: 2019-11-01

## 2019-11-01 LAB
GP B STREP DNA SPEC QL NAA+PROBE: POSITIVE
SPECIMEN SOURCE: ABNORMAL

## 2019-11-01 PROCEDURE — 25000128 H RX IP 250 OP 636: Performed by: OBSTETRICS & GYNECOLOGY

## 2019-11-01 PROCEDURE — G0378 HOSPITAL OBSERVATION PER HR: HCPCS

## 2019-11-01 PROCEDURE — 96361 HYDRATE IV INFUSION ADD-ON: CPT

## 2019-11-01 PROCEDURE — 96372 THER/PROPH/DIAG INJ SC/IM: CPT

## 2019-11-01 PROCEDURE — 99217 ZZC OBSERVATION CARE DISCHARGE: CPT | Performed by: OBSTETRICS & GYNECOLOGY

## 2019-11-01 PROCEDURE — 25800030 ZZH RX IP 258 OP 636: Performed by: OBSTETRICS & GYNECOLOGY

## 2019-11-01 PROCEDURE — 25000132 ZZH RX MED GY IP 250 OP 250 PS 637: Performed by: OBSTETRICS & GYNECOLOGY

## 2019-11-01 RX ADMIN — BETAMETHASONE SODIUM PHOSPHATE AND BETAMETHASONE ACETATE 12 MG: 3; 3 INJECTION, SUSPENSION INTRA-ARTICULAR; INTRALESIONAL; INTRAMUSCULAR at 05:58

## 2019-11-01 RX ADMIN — NIFEDIPINE 10 MG: 10 CAPSULE ORAL at 04:45

## 2019-11-01 RX ADMIN — SODIUM CHLORIDE, POTASSIUM CHLORIDE, SODIUM LACTATE AND CALCIUM CHLORIDE: 600; 310; 30; 20 INJECTION, SOLUTION INTRAVENOUS at 00:36

## 2019-11-01 NOTE — DISCHARGE SUMMARY
[facility] Antepartum Discharge Summary    Karely Munoz MRN# [id number]   Age: 33 year old YOB: 1985     Date of Admission:  10/31/2019  Date of Discharge                       19  Primary care provider: Sophy Cervantes [place]           Chief Complaint:   Karely Munoz is a 33 year old female who is  pregnant female at 32w5d who was admitted yesterday for pre term ctx and a +FFN was noted.  She was admitted for a course of  steroids and has received several doses of nifedipine for Ctx.  Slept overnight and feels better now.    Has had similar episodes of pre term ctx with 2 other pregnancies which delivered at term.          Pregnancy history:     OBSTETRIC HISTORY:    OB History    Para Term  AB Living   4 3 3 0 0 3   SAB TAB Ectopic Multiple Live Births   0 0 0 0 3      # Outcome Date GA Lbr Roberto/2nd Weight Sex Delivery Anes PTL Lv   4 Current            3 Term 09 38w0d  3.232 kg (7 lb 2 oz) F Vag-Spont None  MERCEDES   2 Term 07 38w0d  2.906 kg (6 lb 6.5 oz) M Vag-Spont None  MERCEDES   1 Term 05 38w2d  3.374 kg (7 lb 7 oz) F Vag-Spont EPI  MERCEDES       EDC: Estimated Date of Delivery: Dec 22, 2019    Prenatal Labs:   Lab Results   Component Value Date    ABO O 10/31/2019    RH Neg 10/31/2019    AS Pos (A) 10/31/2019    HEPBANG Non Reactive 2019    CHPCRT Negative 2019    GCPCRT Negative 2019    HGB 12.2 10/31/2019       GBS Status:   No results found for: GBS    Active Problem List  Patient Active Problem List   Diagnosis     Family history of Downs syndrome     Nuchal fold thickening determined by ultrasound     Tobacco smoking affecting pregnancy, antepartum     Rh negative state in antepartum period     Encounter for triage in pregnant patient      uterine contractions in third trimester, antepartum     Positive fetal fibronectin at 22 weeks to 34 weeks gestation     Indication for care in labor or  delivery       Medication Prior to Admission  Medications Prior to Admission   Medication Sig Dispense Refill Last Dose     Prenatal Vit-Fe Fumarate-FA (PRENATAL MULTIVITAMIN W/IRON) 27-0.8 MG tablet Take 1 tablet by mouth daily   Taking   .        Maternal Past Medical History:     Past Medical History:   Diagnosis Date     Depressive disorder     depression/anxiety     Kidney stone      Positive fetal fibronectin at 22 weeks to 34 weeks gestation 10/31/2019                       Family History:   I have reviewed this patient's family history            Social History:     Social History     Tobacco Use     Smoking status: Current Every Day Smoker     Packs/day: 0.25     Smokeless tobacco: Never Used   Substance Use Topics     Alcohol use: Not Currently            Review of Systems:   The Review of Systems is negative other than noted in the HPI          Physical Exam:     Vitals:    19 0030 19 0445 19 0757 19 0846   BP: 117/71 111/73 109/70    Pulse:  88     Resp: 18 16 16 16   Temp: 98.6  F (37  C) 98  F (36.7  C) 97.8  F (36.6  C)    TempSrc: Oral Oral Oral    Weight:       Height:         Gen: resting comfortably, in no acute distress  CV: regular rate, well perfused  Pulm: non-labored breathing, no cough  Abd: gravid, soft, no ttp, no rebound  Skin: warm and dry, no rashes/lesions  Psych: appropriate affect  Neuro: A+Ox3   Cervix: 1/50/-3  unchanged since admission  Ext: trace BLE edema, non-tender                         No results found for this or any previous visit (from the past 24 hour(s)).        Assessment:   Karely Munoz is a  at 32w5d admitted with pre term ctx and noted to have a +FFN  Now S/P betamethasone x 2 and no cervical change.          Plan:    Discharge to home on modified bedrest  Follow up 1 week as scheduled or prn.        Isai Whatley MD

## 2019-11-01 NOTE — PLAN OF CARE
Patient reports feeling more regular, uncomfortable contractions. [place] adjusted and nifedipine PO 10 mg given. Patient declines need for pain medication and appears comfortable. Support person at bedside.

## 2019-11-01 NOTE — PLAN OF CARE
Pt continues to have irregular contractions but not feeling all of them, pt states it has definitely improved since arrival. Cat 1 strip, baby is very active.

## 2019-11-01 NOTE — PROGRESS NOTES
Data: Patient assessed in the [facility] for uterine contractions.  Cervical exam showed no continued change.  Membranes intact.  Contractions present, decreased in intensity.  Action:  Presumed adequate fetal oxygenation documented (see flow record). Discharge instructions reviewed.  Patient instructed to report change in fetal movement, vaginal leaking of fluid or bleeding, abdominal pain, or any concerns related to the pregnancy to her nurse/physician.    Response: Orders to discharge home per Brittney Dunn.  Patient verbalized understanding of education and verbalized agreement with plan. Discharged to home.

## 2019-11-01 NOTE — DISCHARGE INSTRUCTIONS
Discharge Instruction for Undelivered Patients      You were seen for: Labor Assessment  We Consulted: Kandi COLEMAN  You had (Test or Medicine):unterine and fetal monitoring, IV fluids, betamethasone      Diet:   Drink 8 to 12 glasses of liquids (milk, juice, water) every day.  You may eat meals and snacks.     Activity:  Call your doctor or nurse midwife if your baby is moving less than usual.     Call your provider if you notice:  Swelling in your face or increased swelling in your hands or legs.  Headaches that are not relieved by Tylenol (acetaminophen).  Changes in your vision (blurring: seeing spots or stars.)  Nausea (sick to your stomach) and vomiting (throwing up).   Weight gain of 5 pounds or more per week.  Heartburn that doesn't go away.  Signs of bladder infection: pain when you urinate (use the toilet), need to go more often and more urgently.  The bag of felton (rupture of membranes) breaks, or you notice leaking in your underwear.  Bright red blood in your underwear.  Abdominal (lower belly) or stomach pain.  Second (plus) baby: Contractions (tightening) less than 10 minutes apart and getting stronger.  *If less than 34 weeks: Contractions (tightenings) more than 6 times in one hour.  Increase or change in vaginal discharge (note the color and amount)  Other: Bring any necessary paper work for employer to next clinic appointment. Management positions would be more condusive to decrease contractions through the next two weeks if employer allows    Follow-up:  As scheduled in the clinic next week with Dr Knutson and JEANNE  Call sooner with any changes.

## 2019-11-03 NOTE — RESULT ENCOUNTER NOTE
Karely, the culture for the organism group B strep has returned as positive.  This will require some additional antibiotics to be given to you in labor.  We can further discuss this with you at the time of your next office visit.  Please let us know if you have any questions in the interval  Thank you  Angel Melgar M.D.

## 2019-11-04 LAB
BACTERIA SPEC CULT: ABNORMAL
SPECIMEN SOURCE: ABNORMAL

## 2019-11-08 ENCOUNTER — PRENATAL OFFICE VISIT (OUTPATIENT)
Dept: OBGYN | Facility: CLINIC | Age: 34
End: 2019-11-08
Payer: COMMERCIAL

## 2019-11-08 ENCOUNTER — HOSPITAL ENCOUNTER (OUTPATIENT)
Dept: ULTRASOUND IMAGING | Facility: CLINIC | Age: 34
Discharge: HOME | End: 2019-11-08
Attending: OBSTETRICS & GYNECOLOGY | Admitting: OBSTETRICS & GYNECOLOGY
Payer: COMMERCIAL

## 2019-11-08 ENCOUNTER — OFFICE VISIT (OUTPATIENT)
Dept: MATERNAL FETAL MEDICINE | Facility: CLINIC | Age: 34
End: 2019-11-08
Attending: OBSTETRICS & GYNECOLOGY
Payer: COMMERCIAL

## 2019-11-08 VITALS — SYSTOLIC BLOOD PRESSURE: 100 MMHG | WEIGHT: 158 LBS | DIASTOLIC BLOOD PRESSURE: 60 MMHG | BODY MASS INDEX: 27.99 KG/M2

## 2019-11-08 DIAGNOSIS — O47.03 PRETERM UTERINE CONTRACTIONS IN THIRD TRIMESTER, ANTEPARTUM: ICD-10-CM

## 2019-11-08 DIAGNOSIS — O99.330 TOBACCO SMOKING AFFECTING PREGNANCY, ANTEPARTUM: ICD-10-CM

## 2019-11-08 DIAGNOSIS — O40.9XX0 AFI (AMNIOTIC FLUID INDEX) INCREASED: Primary | ICD-10-CM

## 2019-11-08 DIAGNOSIS — O99.820 GBS (GROUP B STREPTOCOCCUS CARRIER), +RV CULTURE, CURRENTLY PREGNANT: Primary | ICD-10-CM

## 2019-11-08 DIAGNOSIS — O36.60X0 EXCESSIVE FETAL GROWTH AFFECTING MANAGEMENT OF PREGNANCY, ANTEPARTUM, SINGLE OR UNSPECIFIED FETUS: ICD-10-CM

## 2019-11-08 DIAGNOSIS — R87.89 POSITIVE FETAL FIBRONECTIN AT 22 WEEKS TO 34 WEEKS GESTATION: ICD-10-CM

## 2019-11-08 DIAGNOSIS — O09.899 POSITIVE FETAL FIBRONECTIN AT 22 WEEKS TO 34 WEEKS GESTATION: ICD-10-CM

## 2019-11-08 DIAGNOSIS — Z67.91 RH NEGATIVE STATE IN ANTEPARTUM PERIOD: ICD-10-CM

## 2019-11-08 DIAGNOSIS — O40.9XX0 AFI (AMNIOTIC FLUID INDEX) INCREASED: ICD-10-CM

## 2019-11-08 DIAGNOSIS — O26.899 RH NEGATIVE STATE IN ANTEPARTUM PERIOD: ICD-10-CM

## 2019-11-08 PROCEDURE — 76816 OB US FOLLOW-UP PER FETUS: CPT

## 2019-11-08 PROCEDURE — 99207 ZZC COMPLICATED OB VISIT: CPT | Performed by: OBSTETRICS & GYNECOLOGY

## 2019-11-08 NOTE — PROGRESS NOTES
No c/o's.  Is s/p BMZ x 2.  No more UC's, no VB, SROM.  MFM U/S today showed 2411 g (56%), AFV WNL.  Has stopped smoking since being in the hospital.  Fetal movement counts BID,  labor/premature rupture of membranes precautions reviewed.  RTC 1 week(s).    Encounter Diagnoses   Name Primary?     GBS (group B Streptococcus carrier), +RV culture, currently pregnant Yes      uterine contractions in third trimester, antepartum      Rh negative state in antepartum period      Tobacco smoking affecting pregnancy, antepartum      Positive fetal fibronectin at 22 weeks to 34 weeks gestation      Nuchal fold thickening determined by ultrasound        Risk factors listed above are stable and being addressed as noted.    Inocencio Knutson MD  [facility]

## 2019-11-08 NOTE — NURSING NOTE
"Chief Complaint   Patient presents with     Prenatal Care     33 5/7 weeks       Initial /60   Wt 71.7 kg (158 lb)   LMP 2019 (Exact Date)   BMI 27.99 kg/m   Estimated body mass index is 27.99 kg/m  as calculated from the following:    Height as of 10/31/19: 1.6 m (5' 3\").    Weight as of this encounter: 71.7 kg (158 lb).  BP completed using cuff size: regular    Questioned patient about current smoking habits.  Pt. has never smoked.          The following HM Due: NONE  +fetal movement  -swelling    Lindsay Argueta, CMA      "

## 2019-11-08 NOTE — PROGRESS NOTES
"Please see \"Imaging\" tab under \"Chart Review\" for details of today's US at the [facility].    David Barillas MD  Maternal-Fetal Medicine    "

## 2019-11-15 ENCOUNTER — HOSPITAL ENCOUNTER (OUTPATIENT)
Facility: CLINIC | Age: 34
Discharge: HOME | End: 2019-11-15
Attending: OBSTETRICS & GYNECOLOGY | Admitting: OBSTETRICS & GYNECOLOGY
Payer: COMMERCIAL

## 2019-11-15 ENCOUNTER — NURSE TRIAGE (OUTPATIENT)
Dept: NURSING | Facility: CLINIC | Age: 34
End: 2019-11-15

## 2019-11-15 VITALS
WEIGHT: 156 LBS | SYSTOLIC BLOOD PRESSURE: 128 MMHG | RESPIRATION RATE: 16 BRPM | HEART RATE: 75 BPM | HEIGHT: 63 IN | TEMPERATURE: 98.7 F | DIASTOLIC BLOOD PRESSURE: 82 MMHG | BODY MASS INDEX: 27.64 KG/M2

## 2019-11-15 PROBLEM — Z36.89 ENCOUNTER FOR TRIAGE IN PREGNANT PATIENT: Status: ACTIVE | Noted: 2019-11-15

## 2019-11-15 LAB
ALBUMIN UR-MCNC: NEGATIVE MG/DL
AMPHETAMINES UR QL SCN: NEGATIVE
APPEARANCE UR: CLEAR
BILIRUB UR QL STRIP: NEGATIVE
CANNABINOIDS UR QL: NEGATIVE
COCAINE UR QL: NEGATIVE
COLOR UR AUTO: ABNORMAL
GLUCOSE UR STRIP-MCNC: 100 MG/DL
HGB UR QL STRIP: NEGATIVE
KETONES UR STRIP-MCNC: NEGATIVE MG/DL
LEUKOCYTE ESTERASE UR QL STRIP: NEGATIVE
NITRATE UR QL: NEGATIVE
OPIATES UR QL SCN: NEGATIVE
PCP UR QL SCN: NEGATIVE
PH UR STRIP: 6.5 PH (ref 5–7)
SOURCE: ABNORMAL
SP GR UR STRIP: 1.01 (ref 1–1.03)
UROBILINOGEN UR STRIP-MCNC: NORMAL MG/DL (ref 0–2)

## 2019-11-15 PROCEDURE — 80307 DRUG TEST PRSMV CHEM ANLYZR: CPT | Performed by: OBSTETRICS & GYNECOLOGY

## 2019-11-15 PROCEDURE — 96360 HYDRATION IV INFUSION INIT: CPT

## 2019-11-15 PROCEDURE — 81003 URINALYSIS AUTO W/O SCOPE: CPT | Performed by: OBSTETRICS & GYNECOLOGY

## 2019-11-15 PROCEDURE — G0463 HOSPITAL OUTPT CLINIC VISIT: HCPCS

## 2019-11-15 PROCEDURE — 40000809 ZZH STATISTIC NO DOCUMENTATION TO SUPPORT CHARGE

## 2019-11-15 PROCEDURE — 25000128 H RX IP 250 OP 636: Performed by: OBSTETRICS & GYNECOLOGY

## 2019-11-15 RX ORDER — DEXTROSE, SODIUM CHLORIDE, SODIUM LACTATE, POTASSIUM CHLORIDE, AND CALCIUM CHLORIDE 5; .6; .31; .03; .02 G/100ML; G/100ML; G/100ML; G/100ML; G/100ML
500 INJECTION, SOLUTION INTRAVENOUS ONCE
Status: COMPLETED | OUTPATIENT
Start: 2019-11-15 | End: 2019-11-15

## 2019-11-15 RX ADMIN — SODIUM CHLORIDE, SODIUM LACTATE, POTASSIUM CHLORIDE, CALCIUM CHLORIDE AND DEXTROSE MONOHYDRATE 500 ML: 5; 600; 310; 30; 20 INJECTION, SOLUTION INTRAVENOUS at 08:36

## 2019-11-15 ASSESSMENT — MIFFLIN-ST. JEOR: SCORE: 1386.74

## 2019-11-15 NOTE — PLAN OF CARE
0700-  here at 34 5/7 with c/o UC every 2-4 min while at home. Denies LOF or vaginal bleeding and reports good fetal movement. Denies any S&S of PIH

## 2019-11-15 NOTE — TELEPHONE ENCOUNTER
"Significant Other (Justin) calls re Patient in labor.   34 weeks pregnant. EDC 2019. Pregnancy #4.   Patient states at 3:00am this am contractions 7-8 minutes apart. Since then progressing in frequency and duration.    Currently contractions every 1-2 minutes apart lasting 65-85 seconds.  Denies vaginal bleeding.  Denies clear fluid leaking.  Baby moving as usual.  Sees Dr. Knutson at [facility]. Will deliver at [facility].    Protocol-  Pregnancy- Labor--Adult  Care advice reviewed.   Disposition-  Go to L&D now  Caller states understanding of the recommended disposition.   Advised to call back if further questions or concerns.     JESS Mcwilliams RN  [facility]     Reason for Disposition    Contractions < 10 minutes apart for 1 hour (i.e., 6 or more contractions an hour)    Additional Information    Negative: Passed out (i.e., lost consciousness, collapsed and was not responding)    Negative: Shock suspected (e.g., cold/pale/clammy skin, too weak to stand, low BP, rapid pulse)    Negative: Difficult to awaken or acting confused (e.g., disoriented, slurred speech)    Negative: [1] SEVERE abdominal pain (e.g., excruciating) AND [2] constant AND [3] present > 1 hour    Negative: Severe bleeding (e.g., continuous red blood from vagina, or large blood clots)    Negative: Umbilical cord hanging out of the vagina (shiny, white, curled appearance, \"like telephone cord\")    Negative: Uncontrollable urge to push (i.e., feels like baby is coming out now)    Negative: Can see baby    Negative: Sounds like a life-threatening emergency to the triager    Negative: MODERATE-SEVERE abdominal pain    Protocols used: PREGNANCY - LABOR - [id number]    "

## 2019-11-15 NOTE — TELEPHONE ENCOUNTER
Called [facility] and spoke with Hyun SANTIAGO to advise that Patient on her way in for evaluation.    ROSETTA McwilliamsN RN  [facility]

## 2019-11-15 NOTE — PROVIDER NOTIFICATION
11/15/19 0909   Provider Notification   Provider Name/Title Lo[facility]   Method of Notification Phone   Notification Reason Patient Arrived;Labor Status;Uterine Activity;Pain;Lab/Diagnostic Study;Status Update;SVE   OK to discharge home, call for follow up next week in clinic

## 2019-11-15 NOTE — DISCHARGE INSTRUCTIONS
Discharge Instruction for Undelivered Patients      You were seen for: Labor Assessment  We Consulted: Dr Cortes  You had (Test or Medicine):Urine culture and fetal and uterine monitoring      Diet:   Drink 8 to 12 glasses of liquids (milk, juice, water) every day.  You may eat meals and snacks.     Activity:  Call your doctor or nurse midwife if your baby is moving less than usual.     Call your provider if you notice:  Swelling in your face or increased swelling in your hands or legs.  Headaches that are not relieved by Tylenol (acetaminophen).  Changes in your vision (blurring: seeing spots or stars.)  Nausea (sick to your stomach) and vomiting (throwing up).   Weight gain of 5 pounds or more per week.  Heartburn that doesn't go away.  Signs of bladder infection: pain when you urinate (use the toilet), need to go more often and more urgently.  The bag of felton (rupture of membranes) breaks, or you notice leaking in your underwear.  Bright red blood in your underwear.  Abdominal (lower belly) or stomach pain.  Second (plus) baby: Contractions (tightening) less than 10 minutes apart and getting stronger.  Increase or change in vaginal discharge (note the color and amount)      Follow-up:  Call for appointment in clinic next week

## 2019-11-18 ENCOUNTER — PRENATAL OFFICE VISIT (OUTPATIENT)
Dept: OBGYN | Facility: CLINIC | Age: 34
End: 2019-11-18
Payer: COMMERCIAL

## 2019-11-18 VITALS — WEIGHT: 158 LBS | SYSTOLIC BLOOD PRESSURE: 116 MMHG | BODY MASS INDEX: 27.99 KG/M2 | DIASTOLIC BLOOD PRESSURE: 64 MMHG

## 2019-11-18 DIAGNOSIS — O47.03 PRETERM UTERINE CONTRACTIONS IN THIRD TRIMESTER, ANTEPARTUM: ICD-10-CM

## 2019-11-18 DIAGNOSIS — O26.899 RH NEGATIVE STATE IN ANTEPARTUM PERIOD: ICD-10-CM

## 2019-11-18 DIAGNOSIS — O99.330 TOBACCO SMOKING AFFECTING PREGNANCY, ANTEPARTUM: ICD-10-CM

## 2019-11-18 DIAGNOSIS — O09.899 POSITIVE FETAL FIBRONECTIN AT 22 WEEKS TO 34 WEEKS GESTATION: ICD-10-CM

## 2019-11-18 DIAGNOSIS — R87.89 POSITIVE FETAL FIBRONECTIN AT 22 WEEKS TO 34 WEEKS GESTATION: ICD-10-CM

## 2019-11-18 DIAGNOSIS — Z67.91 RH NEGATIVE STATE IN ANTEPARTUM PERIOD: ICD-10-CM

## 2019-11-18 DIAGNOSIS — O99.820 GBS (GROUP B STREPTOCOCCUS CARRIER), +RV CULTURE, CURRENTLY PREGNANT: ICD-10-CM

## 2019-11-18 PROCEDURE — 59425 ANTEPARTUM CARE ONLY: CPT | Performed by: OBSTETRICS & GYNECOLOGY

## 2019-11-18 PROCEDURE — 99207 ZZC PRENATAL VISIT: CPT | Performed by: OBSTETRICS & GYNECOLOGY

## 2019-11-18 NOTE — PROGRESS NOTES
Had contractions on Friday, which went away after IVF in triage.  She works as a  and had a big day yesterday for the [employer] game, lots of contractions.  +FM, no VB or gushing OF.    33 year old  at 35w1d   - GBS pos  - PT ctx with +FFN and s/p BMZ on 10/31 and .  Slightly more dilated today than in triage on Friday (1cm/50%) however today it seemed that we checked during a contraction.  She is not having regular or frequent contractions now.  Discussed modified bedrest, hydration    RTC weekly until delivery    Vonnie Cortes MD, MPH  [facility]

## 2019-11-18 NOTE — NURSING NOTE
"Chief Complaint   Patient presents with     Prenatal Care     35w1d       Initial /64   Wt 71.7 kg (158 lb)   LMP 2019 (Exact Date)   BMI 27.99 kg/m   Estimated body mass index is 27.99 kg/m  as calculated from the following:    Height as of 11/15/19: 1.6 m (5' 3\").    Weight as of this encounter: 71.7 kg (158 lb).  BP completed using cuff size: regular    Questioned patient about current smoking habits.  Pt. Is a smoker          The following HM Due: NONE      Pt was seen in L&D for contractions Friday morning and then was sent home no cervix change was noticed. Has noticed leaking since Friday when was home through the weekend.    Shun Anderson MA           "

## 2019-11-21 ENCOUNTER — NURSE TRIAGE (OUTPATIENT)
Dept: NURSING | Facility: CLINIC | Age: 34
End: 2019-11-21

## 2019-11-21 ENCOUNTER — HOSPITAL ENCOUNTER (INPATIENT)
Facility: CLINIC | Age: 34
LOS: 2 days | Discharge: HOME | End: 2019-11-23
Attending: OBSTETRICS & GYNECOLOGY | Admitting: OBSTETRICS & GYNECOLOGY
Payer: COMMERCIAL

## 2019-11-21 LAB
ABO + RH BLD: NORMAL
AMPHETAMINES UR QL SCN: NEGATIVE
BLOOD BANK CMNT PATIENT-IMP: NORMAL
BLOOD BANK CMNT PATIENT-IMP: NORMAL
CANNABINOIDS UR QL: NEGATIVE
COCAINE UR QL: NEGATIVE
DATE RH IMM GL GVN: NORMAL
FETAL CELL SCN BLD QL ROSETTE: NORMAL
OPIATES UR QL SCN: NEGATIVE
PCP UR QL SCN: NEGATIVE
RH IG VIALS RECOM PATIENT: NORMAL
SPECIMEN EXP DATE BLD: NORMAL
T PALLIDUM AB SER QL: NONREACTIVE

## 2019-11-21 PROCEDURE — 86780 TREPONEMA PALLIDUM: CPT | Performed by: OBSTETRICS & GYNECOLOGY

## 2019-11-21 PROCEDURE — 86900 BLOOD TYPING SEROLOGIC ABO: CPT | Performed by: OBSTETRICS & GYNECOLOGY

## 2019-11-21 PROCEDURE — 88307 TISSUE EXAM BY PATHOLOGIST: CPT | Mod: 26 | Performed by: OBSTETRICS & GYNECOLOGY

## 2019-11-21 PROCEDURE — 12000000 ZZH R&B MED SURG/OB

## 2019-11-21 PROCEDURE — 72200001 ZZH LABOR CARE VAGINAL DELIVERY SINGLE

## 2019-11-21 PROCEDURE — 88307 TISSUE EXAM BY PATHOLOGIST: CPT | Performed by: OBSTETRICS & GYNECOLOGY

## 2019-11-21 PROCEDURE — 80307 DRUG TEST PRSMV CHEM ANLYZR: CPT | Performed by: OBSTETRICS & GYNECOLOGY

## 2019-11-21 PROCEDURE — 85461 HEMOGLOBIN FETAL: CPT | Performed by: OBSTETRICS & GYNECOLOGY

## 2019-11-21 PROCEDURE — 25000132 ZZH RX MED GY IP 250 OP 250 PS 637: Performed by: OBSTETRICS & GYNECOLOGY

## 2019-11-21 PROCEDURE — 36415 COLL VENOUS BLD VENIPUNCTURE: CPT | Performed by: OBSTETRICS & GYNECOLOGY

## 2019-11-21 PROCEDURE — 25000125 ZZHC RX 250: Performed by: OBSTETRICS & GYNECOLOGY

## 2019-11-21 PROCEDURE — 86901 BLOOD TYPING SEROLOGIC RH(D): CPT | Performed by: OBSTETRICS & GYNECOLOGY

## 2019-11-21 PROCEDURE — 99213 OFFICE O/P EST LOW 20 MIN: CPT | Performed by: OBSTETRICS & GYNECOLOGY

## 2019-11-21 PROCEDURE — 25000128 H RX IP 250 OP 636: Performed by: OBSTETRICS & GYNECOLOGY

## 2019-11-21 RX ORDER — METHYLERGONOVINE MALEATE 0.2 MG/ML
200 INJECTION INTRAVENOUS
Status: DISCONTINUED | OUTPATIENT
Start: 2019-11-21 | End: 2019-11-21

## 2019-11-21 RX ORDER — PRENATAL VIT/IRON FUM/FOLIC AC 27MG-0.8MG
1 TABLET ORAL DAILY
Status: DISCONTINUED | OUTPATIENT
Start: 2019-11-21 | End: 2019-11-23 | Stop reason: HOSPADM

## 2019-11-21 RX ORDER — OXYTOCIN/0.9 % SODIUM CHLORIDE 30/500 ML
340 PLASTIC BAG, INJECTION (ML) INTRAVENOUS CONTINUOUS PRN
Status: DISCONTINUED | OUTPATIENT
Start: 2019-11-21 | End: 2019-11-23 | Stop reason: HOSPADM

## 2019-11-21 RX ORDER — AMOXICILLIN 250 MG
1 CAPSULE ORAL 2 TIMES DAILY
Status: DISCONTINUED | OUTPATIENT
Start: 2019-11-21 | End: 2019-11-23 | Stop reason: HOSPADM

## 2019-11-21 RX ORDER — ACETAMINOPHEN 325 MG/1
650 TABLET ORAL EVERY 4 HOURS PRN
Status: DISCONTINUED | OUTPATIENT
Start: 2019-11-21 | End: 2019-11-23 | Stop reason: HOSPADM

## 2019-11-21 RX ORDER — OXYTOCIN/0.9 % SODIUM CHLORIDE 30/500 ML
100-340 PLASTIC BAG, INJECTION (ML) INTRAVENOUS CONTINUOUS PRN
Status: COMPLETED | OUTPATIENT
Start: 2019-11-21 | End: 2019-11-21

## 2019-11-21 RX ORDER — IBUPROFEN 800 MG/1
800 TABLET, FILM COATED ORAL EVERY 6 HOURS PRN
Status: DISCONTINUED | OUTPATIENT
Start: 2019-11-21 | End: 2019-11-23 | Stop reason: HOSPADM

## 2019-11-21 RX ORDER — OXYTOCIN/0.9 % SODIUM CHLORIDE 30/500 ML
100 PLASTIC BAG, INJECTION (ML) INTRAVENOUS CONTINUOUS
Status: DISCONTINUED | OUTPATIENT
Start: 2019-11-21 | End: 2019-11-23 | Stop reason: HOSPADM

## 2019-11-21 RX ORDER — FENTANYL CITRATE 50 UG/ML
50-100 INJECTION, SOLUTION INTRAMUSCULAR; INTRAVENOUS
Status: DISCONTINUED | OUTPATIENT
Start: 2019-11-21 | End: 2019-11-21

## 2019-11-21 RX ORDER — PENICILLIN G POTASSIUM 5000000 [IU]/1
5 INJECTION, POWDER, FOR SOLUTION INTRAMUSCULAR; INTRAVENOUS ONCE
Status: DISCONTINUED | OUTPATIENT
Start: 2019-11-21 | End: 2019-11-21

## 2019-11-21 RX ORDER — SODIUM CHLORIDE, SODIUM LACTATE, POTASSIUM CHLORIDE, CALCIUM CHLORIDE 600; 310; 30; 20 MG/100ML; MG/100ML; MG/100ML; MG/100ML
INJECTION, SOLUTION INTRAVENOUS CONTINUOUS
Status: DISCONTINUED | OUTPATIENT
Start: 2019-11-21 | End: 2019-11-21

## 2019-11-21 RX ORDER — METHYLERGONOVINE MALEATE 0.2 MG/ML
200 INJECTION INTRAVENOUS
Status: DISCONTINUED | OUTPATIENT
Start: 2019-11-21 | End: 2019-11-23 | Stop reason: HOSPADM

## 2019-11-21 RX ORDER — NALOXONE HYDROCHLORIDE 0.4 MG/ML
.1-.4 INJECTION, SOLUTION INTRAMUSCULAR; INTRAVENOUS; SUBCUTANEOUS
Status: DISCONTINUED | OUTPATIENT
Start: 2019-11-21 | End: 2019-11-23 | Stop reason: HOSPADM

## 2019-11-21 RX ORDER — ACETAMINOPHEN 325 MG/1
650 TABLET ORAL EVERY 4 HOURS PRN
Status: DISCONTINUED | OUTPATIENT
Start: 2019-11-21 | End: 2019-11-21

## 2019-11-21 RX ORDER — OXYCODONE AND ACETAMINOPHEN 5; 325 MG/1; MG/1
1 TABLET ORAL
Status: DISCONTINUED | OUTPATIENT
Start: 2019-11-21 | End: 2019-11-21

## 2019-11-21 RX ORDER — MISOPROSTOL 200 UG/1
800 TABLET ORAL
Status: DISCONTINUED | OUTPATIENT
Start: 2019-11-21 | End: 2019-11-23 | Stop reason: HOSPADM

## 2019-11-21 RX ORDER — HYDROCORTISONE 2.5 %
CREAM (GRAM) TOPICAL 3 TIMES DAILY PRN
Status: DISCONTINUED | OUTPATIENT
Start: 2019-11-21 | End: 2019-11-23 | Stop reason: HOSPADM

## 2019-11-21 RX ORDER — IBUPROFEN 800 MG/1
800 TABLET, FILM COATED ORAL
Status: DISCONTINUED | OUTPATIENT
Start: 2019-11-21 | End: 2019-11-21

## 2019-11-21 RX ORDER — OXYTOCIN 10 [USP'U]/ML
10 INJECTION, SOLUTION INTRAMUSCULAR; INTRAVENOUS
Status: DISCONTINUED | OUTPATIENT
Start: 2019-11-21 | End: 2019-11-21

## 2019-11-21 RX ORDER — CARBOPROST TROMETHAMINE 250 UG/ML
250 INJECTION, SOLUTION INTRAMUSCULAR
Status: DISCONTINUED | OUTPATIENT
Start: 2019-11-21 | End: 2019-11-21

## 2019-11-21 RX ORDER — AMOXICILLIN 250 MG
2 CAPSULE ORAL 2 TIMES DAILY
Status: DISCONTINUED | OUTPATIENT
Start: 2019-11-21 | End: 2019-11-23 | Stop reason: HOSPADM

## 2019-11-21 RX ORDER — CARBOPROST TROMETHAMINE 250 UG/ML
250 INJECTION, SOLUTION INTRAMUSCULAR
Status: DISCONTINUED | OUTPATIENT
Start: 2019-11-21 | End: 2019-11-23 | Stop reason: HOSPADM

## 2019-11-21 RX ORDER — OXYTOCIN 10 [USP'U]/ML
10 INJECTION, SOLUTION INTRAMUSCULAR; INTRAVENOUS
Status: DISCONTINUED | OUTPATIENT
Start: 2019-11-21 | End: 2019-11-23 | Stop reason: HOSPADM

## 2019-11-21 RX ORDER — ONDANSETRON 2 MG/ML
4 INJECTION INTRAMUSCULAR; INTRAVENOUS EVERY 6 HOURS PRN
Status: DISCONTINUED | OUTPATIENT
Start: 2019-11-21 | End: 2019-11-21

## 2019-11-21 RX ORDER — LANOLIN 100 %
OINTMENT (GRAM) TOPICAL
Status: DISCONTINUED | OUTPATIENT
Start: 2019-11-21 | End: 2019-11-23 | Stop reason: HOSPADM

## 2019-11-21 RX ORDER — NALOXONE HYDROCHLORIDE 0.4 MG/ML
.1-.4 INJECTION, SOLUTION INTRAMUSCULAR; INTRAVENOUS; SUBCUTANEOUS
Status: DISCONTINUED | OUTPATIENT
Start: 2019-11-21 | End: 2019-11-21

## 2019-11-21 RX ORDER — BISACODYL 10 MG
10 SUPPOSITORY, RECTAL RECTAL DAILY PRN
Status: DISCONTINUED | OUTPATIENT
Start: 2019-11-23 | End: 2019-11-23 | Stop reason: HOSPADM

## 2019-11-21 RX ADMIN — FENTANYL CITRATE 50 MCG: 50 INJECTION, SOLUTION INTRAMUSCULAR; INTRAVENOUS at 01:45

## 2019-11-21 RX ADMIN — PRENATAL VIT W/ FE FUMARATE-FA TAB 27-0.8 MG 1 TABLET: 27-0.8 TAB at 09:51

## 2019-11-21 RX ADMIN — Medication 340 ML/HR: at 02:05

## 2019-11-21 RX ADMIN — HUMAN RHO(D) IMMUNE GLOBULIN 300 MCG: 300 INJECTION, SOLUTION INTRAMUSCULAR at 21:14

## 2019-11-21 ASSESSMENT — MIFFLIN-ST. JEOR: SCORE: 1348.18

## 2019-11-21 NOTE — L&D DELIVERY NOTE
Delivery Summary    33 year old  at 35w4d who presented at 2.5cm dilated and delivered precipitously within minutes, with no provider present in the room.  According to RNs, one RN was in the room hooking up antibiotics and the patient asked for fentanyl but before the RN was able to dose fentanyl, she noted that the head had delivered.  The patient was attempting to not push as FOB had stepped out briefly, there was also a nuchal cord.  A second RN was called to the room.  When instructed to push, she delivered an initially floppy female infant who became vigorous quickly after stimulation.  The in house MD entered the room, clamped and cut the umbilical cord and the infant was handed off to the warmer and awaited NICU team.  Placenta delivered intact and then this provider entered the room.  Perineum was intact, QBL still pending.      Vonnie Cortes MD, MPH  [facility]         Labor Event Times    Labor onset date:  19 Onset time:  10:00 PM   Dilation complete date:  19 Complete time:   1:49 AM   Start pushing date/time:  2019 0149      Labor Events     labor?:  Yes   steroids:  Full Course  Labor Type:  Spontaneous  Predominate monitoring during 1st stage:  continuous electronic fetal monitoring     Antibiotics received during labor?:  No     Rupture date/time: 19 0030   Rupture type:  Spontaneous rupture of membranes occuring during spontaneous labor or augmentation  Fluid color:  Clear  Fluid odor:  Normal     1:1 continuous labor support provided by?:  RN Labor partogram used?:  no      Delivery/Placenta Date and Time    Delivery Date:  19 Delivery Time:   1:51 AM   Placenta Date/Time:  2019  1:55 AM  Oxytocin given at the time of delivery:  after delivery of placenta     Vaginal Counts     Initial count performed by 2 team members:   Two Team Members   Dr. Miles Roper RN       [employer]    Initial counts 2  5    Added to count       Final counts 2  5    Placed during labor Accounted for at the end of labor   NA NA   NA NA   NA NA    Final count performed by 2 team members:   Two Team Members   Jennifer Roper       Final count correct?:  Yes         Apgars    Living status:  Living   1 Minute 5 Minute 10 Minute 15 Minute 20 Minute   Skin color: 0        Heart rate: 2        Reflex irritability: 1        Muscle tone: 1        Respiratory effort: 1        Total: 5           Cord    Vessels:  3 Vessels    Cord Blood Disposition:  Lab Gases Sent?:  Yes      Skin to Skin and Feeding Plan    Skin to skin initiation date/time:     Skin to skin end date/time:     Reason skin to skin not initiated:  [place] Ac[employer]     Delivery (Maternal) (Provider to Complete) ([id number])    Episiotomy:  None  Perineal lacerations:  None       Blood Loss  Mother: Karely Munoz #[id number]   Start of Mother's Information    IO Blood Loss  190 - 19 0222    None           End of Mother's Information  Mother: Karely Munoz #[id number]         Delivery - Provider to Complete ([id number])    Attempted Delivery Types (Choose all that apply):  Spontaneous Vaginal Delivery  Delivery Type (Choose the 1 that will go to the Birth History):  Vaginal, Spontaneous   Other personnel:   Provider Role   Joan Roper, RN Delivery Nurse   Kaylin Cruz, RN Delivery Assist         Placenta    Date/Time:  2019  1:55 AM  Removal:  Spontaneous  Disposition:  Pathology     Anesthesia    Method:  None          Presentation and Position    Presentation:  Vertex          Vonnie Cortes MD

## 2019-11-21 NOTE — PLAN OF CARE
Data: Vital signs within normal limits. Postpartum checks within normal limits - see flow record. Patient eating and drinking normally. Patient able to empty bladder independently and is up ambulating.  Patient performing self cares and is able to care for infant.  Action: Patient declined pain medication this shift - patient stated she was comfortable. Patient education done about breastfeeding, bleeding, pain control. See flow record.  Response: Positive attachment behaviors observed with infant. Support persons FOB present.   Plan: Anticipate discharge on 11/23.

## 2019-11-21 NOTE — PLAN OF CARE
0105 Pt arrived to unit and brought to room 12. She states her water broke at 0030 and has been christian since 2230 last evening.   0111 Placed pt on monitor to get FHT.  0113 SVE 2-3/90/0. Pt rating her pain 10/10 and is extremely uncomfortable. She states she would not like anything for pain.   0117 Orders received from Dr. Prashant Cortes. RN 0118 RN at bedside with pt coaching her through contractions and attempting to get and IV to start GBS prophylaxis. Pt now requesting something for pain. Explained options for pain, pt requesting IV fentanyl.  0120 Pt alternating between sidelying position, standing, hands and knees to get through contractions.  0140 IV finally placed after second attempt.   0146 RN giving IV fentanyl 50mcg  0149 Pt started involuntary pushing. RN went to check SVE and noticed baby at +3 station and pt is still pushing. Instructed patient to roll to side and try not to push. RN pulled call light and requested IHOB and extra hands in the room immediately. Pt rolled to back  Baby head delivered, nuchal x1 reduced. Rochelle Ewing RN entered room to help. Both RN's instructing patient to push. Pt not pushing  Both RN's are instructing pt to continue pushing her baby out. Minimal pushing effort by mom. Finally, after approximately 100 seconds, mother was able to push with more effort and RN was able to deliver the body at 0151.   Baby had no color at delivery, did cry with stimulation, and had poor tone. Continued to stimulate. IHOB, Dr. Aquino, arrived shortly after delivery approximately 0153 to cut and clamp cord.   See note from Dr. Aquino.    Joan Roper, RN on 11/21/2019 at 6:49 AM

## 2019-11-21 NOTE — PLAN OF CARE
Patients mobililty level scored using the bedside mobility assistance tool (BMAT). Patient is at a mobility level test number: 4. Mobility equipment used: wheelchair. Required assist of 0 staff members. Further use of BMAT scoring not required.    Joan Roper RN on 11/21/2019 at 6:08 AM

## 2019-11-21 NOTE — TELEPHONE ENCOUNTER
"Karely reports that she's had contractions for about 2 hours.  For the past hour, her contractions have been about 4-5 minutes apart.    She believes that her water broke about 2 hours ago as well. She's had a slow leak of fluid.    She reports that she was seen recently in the clinic for a recurrent slow leak of amniotic.    12:19 am - Called  [facility]. Spoke to PAMELA Rice. Patient name, MRN, , 35w4d and patient reports of contractions and fluid leaking. Patient to report to L&D.    Notified Karely to report to L&D via ER.  She estimates her ETA at ~30 minutes.  ER notified per patient request.    Nadine Dale RN  [facility]      Reason for Disposition    [1] History of prior delivery (multipara) AND [2] contractions < 10 minutes apart AND [3] present 1 hour    Additional Information    Negative: Passed out (i.e., lost consciousness, collapsed and was not responding)    Negative: Shock suspected (e.g., cold/pale/clammy skin, too weak to stand, low BP, rapid pulse)    Negative: Difficult to awaken or acting confused (e.g., disoriented, slurred speech)    Negative: [1] SEVERE abdominal pain (e.g., excruciating) AND [2] constant AND [3] present > 1 hour    Negative: Severe bleeding (e.g., continuous red blood from vagina, or large blood clots)    Negative: Umbilical cord hanging out of the vagina (shiny, white, curled appearance, \"like telephone cord\")    Negative: Uncontrollable urge to push (i.e., feels like baby is coming out now)    Negative: Can see baby    Negative: Sounds like a life-threatening emergency to the triager    Negative: [1] First baby (primipara) AND [2] contractions < 6 minutes apart  AND [3] present 2 hours    Protocols used: PREGNANCY - LABOR-A-AH      "

## 2019-11-21 NOTE — PROGRESS NOTES
Karely Munoz is a 33 year old  who was admitted at 35w4d GA.  I was called immediately to go to her room in light of precipitous delivery. By th time of my arrival baby was out. I took over immediately and while nurses were assessing the baby I quickly clamped the cord and cut it. Noticed a 3 VC. Baby was further resuscitated accordingly. A cord segment was collected for blood gases. Then the placenta was delivered without difficulties with one maternal push. The placenta appeared intact. Her uterus tone was adequate and no significant bleeding was appreciated. Pitocin eventually was started.  Exam of the perineum revealed no lacerations. QBL pending cc.   cc    Dr. Grace Aquino  [phone]      Delivery Summary    Karely Munoz MRN# [id number]   Age: 33 year old YOB: 1985               Kasey Aquino MD

## 2019-11-21 NOTE — LACTATION NOTE
This note was copied from a baby's chart.  LC visit and assist with latch attempt.  Infant's blood sugar was good at 71.  She had a stool.  She has been sleepy for feeds at times.  Karely successfully nursed her other children.  This feeding that was observed was not ideal positioning, so LC offered support and suggestions with infant holds, but she prefers to latch her in a cradle hold and leans down to infant.  LC suggested lifting infant higher, trying the football hold, and using HE to entice the latch.  A few positions changed, but no infant interest in latch. LC suggested using HE to give her some drops of colostrum.  Discussed her higher risk for jaundice due to her bruised face and LPT status, and encouraged her to continue to work on feedings often today.  If no improvement on next latch, LC will suggest pumping post feeds as well as HE.  LC also recommends that she try to nurse infant again in about an hour or 1.5 hours.  RN updated and aware.

## 2019-11-21 NOTE — PLAN OF CARE
Data: Karely Munoz transferred to [facility] via wheelchair at 0515. Baby transferred via parent's arms.  Action: Receiving unit notified of transfer: Yes. Patient and family notified of room change. Report given to Jaqueline at 0520. Belongings sent to receiving unit. Accompanied by Registered Nurse. Oriented patient to surroundings. Call light within reach. ID bands double-checked with receiving RN.  Response: Patient tolerated transfer and is stable.    Joan Roper RN on 11/21/2019 at 6:28 AM

## 2019-11-21 NOTE — H&P
L&D History and Physical   2019  Karely Munoz  [id number]      HPI: Karely Munoz is a 33 year old  at 35w4d here with contractions and SROM at 0030 in the car on the way here, delivered precipitously without a provider in the room upon arrival.     She has a history of +FFN in this pregnancy and is s/p BMZ on 10/31 and  (even quit smoking after this), had been 1cm dilated at that time and was 2cm on .  Here today with FOB Justin.     Pregnancy notable for:  --+FFN and s/p BMZ course at 32 weeks  --tobacco use, quit several weeks ago  --Rh neg  --h/o child w T21, normal NIPT, thickened nuchal fold but o/w normal on ultrasound    OBHX:   OB History    Para Term  AB Living   4 3 3 0 0 3   SAB TAB Ectopic Multiple Live Births   0 0 0 0 3      # Outcome Date GA Lbr Roberto/2nd Weight Sex Delivery Anes PTL Lv   4 Current            3 Term 09 38w0d  3.232 kg (7 lb 2 oz) F Vag-Spont None  MERCEDES   2 Term 07 38w0d  2.906 kg (6 lb 6.5 oz) M Vag-Spont None  MERCEDES   1 Term 05 38w2d  3.374 kg (7 lb 7 oz) F Vag-Spont EPI  MERCEDES       MedicalHX:   Past Medical History:   Diagnosis Date     Depressive disorder     depression/anxiety     Kidney stone      Positive fetal fibronectin at 22 weeks to 34 weeks gestation 10/31/2019       SurgicalHX:   Past Surgical History:   Procedure Laterality Date     ENT SURGERY Bilateral     Ear tubes     HERNIA REPAIR Left     Inguinal       Medications:   No current facility-administered medications on file prior to encounter.   Prenatal Vit-Fe Fumarate-FA (PRENATAL MULTIVITAMIN W/IRON) 27-0.8 MG tablet, Take 1 tablet by mouth daily        Allergies:  No Known Allergies    FamilyHX:  Family History   Problem Relation Age of Onset     Hearing Loss Mother        SocialHX:   Social History     Socioeconomic History     Marital status:      Spouse name: None     Number of children: None     Years of education: None     Highest education  level: None   Occupational History     None   Social Needs     Financial resource strain: None     Food insecurity:     Worry: None     Inability: None     Transportation needs:     Medical: None     Non-medical: None   Tobacco Use     Smoking status: Current Every Day Smoker     Packs/day: 0.25     Smokeless tobacco: Never Used   Substance and Sexual Activity     Alcohol use: Not Currently     Drug use: No     Sexual activity: Yes     Partners: Male     Birth control/protection: None   Lifestyle     Physical activity:     Days per week: None     Minutes per session: None     Stress: None   Relationships     Social connections:     Talks on phone: None     Gets together: None     Attends [employer] service: None     Active member of club or organization: None     Attends meetings of clubs or organizations: None     Relationship status: None     Intimate partner violence:     Fear of current or ex partner: None     Emotionally abused: None     Physically abused: None     Forced sexual activity: None   Other Topics Concern     Parent/sibling w/ CABG, MI or angioplasty before 65F 55M? Not Asked   Social History Narrative     None       ROS: 10-point ROS negative except as in HPI     Physical Exam:  Vitals:    11/21/19 0115   Resp: 16   Temp: 98  F (36.7  C)   TempSrc: Oral     GEN: already delivered, tearful and shocked in appearance  CV: regular rate, ext WWP  PULM: no increased work of breathing, no cough/wheeze  ABD: soft, non-tender, non-distended  EXT: no edema, non tender to palpation  Vag: perineum intact    Labs:      Lab Results   Component Value Date    ABO PENDING 11/21/2019    RH Neg 10/31/2019    AS Pos (A) 10/31/2019    HEPBANG Non Reactive 05/09/2019    CHPCRT Negative 05/09/2019    GCPCRT Negative 05/09/2019    HGB 12.2 10/31/2019       GBS Status:   Lab Results   Component Value Date    GBS Positive (A) 10/31/2019       Lab Results   Component Value Date    PAP NIL, neg HPV 05/09/2019       A/P: Karely  YUE Munoz is a 33 year old female  at 35w4d, here for  delivery  - baby delivered precipitously upon arrival, see delivery sum for further details    Vonnie Cortes MD, MPH  [facility]

## 2019-11-21 NOTE — PROVIDER NOTIFICATION
11/21/19 0117   Provider Notification   Provider Name/Title Dr. Prashant Cortes   Method of Notification Phone   Patient arrived to  for r/o labor. SVE 2.5/90/0. Nai every 2-3 minutes, tearful. GBS positive. History of 2 positive FFN. Betamethasone give on 10/31 and 11/1. Patient stated her water broke on her way here. Wheelchair was noted to be wet after patient got up    Intrapartum orders received, PCN for GBS. No need to collect a ROM+

## 2019-11-22 LAB
COPATH REPORT: NORMAL
HGB BLD-MCNC: 12.9 G/DL (ref 11.7–15.7)

## 2019-11-22 PROCEDURE — 85018 HEMOGLOBIN: CPT | Performed by: OBSTETRICS & GYNECOLOGY

## 2019-11-22 PROCEDURE — 40000083 ZZH STATISTIC IP LACTATION SERVICES 1-15 MIN

## 2019-11-22 PROCEDURE — 25000132 ZZH RX MED GY IP 250 OP 250 PS 637: Performed by: OBSTETRICS & GYNECOLOGY

## 2019-11-22 PROCEDURE — 12000000 ZZH R&B MED SURG/OB

## 2019-11-22 PROCEDURE — 36415 COLL VENOUS BLD VENIPUNCTURE: CPT | Performed by: OBSTETRICS & GYNECOLOGY

## 2019-11-22 RX ADMIN — PRENATAL VIT W/ FE FUMARATE-FA TAB 27-0.8 MG 1 TABLET: 27-0.8 TAB at 08:23

## 2019-11-22 NOTE — PLAN OF CARE
Vss afebrile.  Pt denies pain. Up indep in room.  Voiding with out problems. Pt showered. Breast feeding well with latch score of 9. Pt attentive to baby's needs. ROP paper work given to pt. Pt doesn't have ID to complete form.

## 2019-11-22 NOTE — PROGRESS NOTES
Public Health Nurse (PHN) met with patient, discussed resources within [facility].  Provided family resources of [facility] services resource card, home visiting card, Follow along card, community resource guide and car seat information card given and discussed.  Patient is aware how to add baby to insurance and has a primary provider arranged for baby.  Offered referral for home visiting, patient replied no to referral. Patient reports no questions or concerns at this time.

## 2019-11-22 NOTE — PROGRESS NOTES
[facility]   Post-Partum Progress Note          Assessment and Plan:    Assessment:   Post-partum day #1  Normal spontaneous vaginal delivery  L&D complications: None      Doing well.  Pain well-controlled.      Plan:   Ambulation encouraged  Anticipate discharge tomorrow           Interval History:   Doing well.  Pain is well-controlled.  No fevers.  No history of foul-smelling vaginal discharge.  Good appetite.  Denies chest pain, shortness of breath, nausea or vomiting.  Vaginal bleeding is similar to a heavy menstrual flow.  Ambulatory.  Breastfeeding well.          Significant Problems:    None          Review of Systems:    CONSTITUTIONAL: NEGATIVE for fever, chills, change in weight  INTEGUMENTARY/SKIN: NEGATIVE for worrisome rashes, moles or lesions  EYES: NEGATIVE for vision changes or irritation  ENT/MOUTH: NEGATIVE for ear, mouth and throat problems  RESP: NEGATIVE for significant cough or SOB  BREAST: NEGATIVE for masses, tenderness or discharge  CV: NEGATIVE for chest pain, palpitations or peripheral edema  GI: NEGATIVE for nausea, abdominal pain, heartburn, or change in bowel habits  : NEGATIVE for frequency, dysuria, or hematuria  MUSCULOSKELETAL: NEGATIVE for significant arthralgias or myalgia  NEURO: NEGATIVE for weakness, dizziness or paresthesias  ENDOCRINE: NEGATIVE for temperature intolerance, skin/hair changes  HEME: NEGATIVE for bleeding problems  PSYCHIATRIC: NEGATIVE for changes in mood or affect          Medications:   All medications related to the patient's surgery have been reviewed  -          Physical Exam:     All vitals stable  Patient Vitals for the past 8 hrs:   BP Temp Temp src Heart Rate Resp   11/22/19 0800 123/71 97.4  F (36.3  C) Oral 72 16   11/22/19 0225 113/76 98.2  F (36.8  C) Oral 78 16     Uterine fundus is firm, non-tender and at the level of the umbilicus          Data:     All laboratory data related to this surgery reviewed  Hemoglobin   Date Value  Ref Range Status   11/22/2019 12.9 11.7 - 15.7 g/dL Final   10/31/2019 12.2 11.7 - 15.7 g/dL Final   09/25/2019 11.0 (L) 11.7 - 15.7 g/dL Final   05/09/2019 13.7 11.7 - 15.7 g/dL Final   04/07/2018 12.8 11.7 - 15.7 g/dL Final     -    Myrna Manzo DO

## 2019-11-22 NOTE — PLAN OF CARE
Continues to work on breast feeding, encouraged skin to skin and to call for help as needed. Declines offers of pain medications, denies difficulty voiding. Rhogam given. FOB present ands supportive, involved in infant cares.

## 2019-11-22 NOTE — PLAN OF CARE
Patient vital signs stable and meeting expected outcomes.  Breastfeeding well with minimal assistance from staff.  Up independently and voiding adequately.  Pain well controlled; no requests for pain medications this shift.  Able to perform all cares for self and infant.  Bonding well with baby.  FOB present and supportive at bedside.  Will continue to monitor.

## 2019-11-23 VITALS
HEIGHT: 60 IN | DIASTOLIC BLOOD PRESSURE: 54 MMHG | WEIGHT: 158 LBS | SYSTOLIC BLOOD PRESSURE: 97 MMHG | BODY MASS INDEX: 31.02 KG/M2 | HEART RATE: 70 BPM | TEMPERATURE: 98.5 F | RESPIRATION RATE: 16 BRPM

## 2019-11-23 PROCEDURE — 25000132 ZZH RX MED GY IP 250 OP 250 PS 637: Performed by: OBSTETRICS & GYNECOLOGY

## 2019-11-23 RX ORDER — IBUPROFEN 200 MG
400 TABLET ORAL EVERY 4 HOURS PRN
COMMUNITY
Start: 2019-11-23 | End: 2020-10-20

## 2019-11-23 RX ORDER — ACETAMINOPHEN 325 MG/1
650 TABLET ORAL EVERY 4 HOURS PRN
COMMUNITY
Start: 2019-11-23 | End: 2021-03-03

## 2019-11-23 RX ADMIN — PRENATAL VIT W/ FE FUMARATE-FA TAB 27-0.8 MG 1 TABLET: 27-0.8 TAB at 08:56

## 2019-11-23 NOTE — DISCHARGE INSTRUCTIONS
Postpartum Vaginal Delivery Instructions  [facility]:  [phone]    Activity       Ask family and friends for help when you need it.    Do not place anything in your vagina for 6 weeks.    You are not restricted on other activities, but take it easy for a few weeks to allow your body to recover from delivery.  You are able to do any activities you feel up to that point.    No driving until you have stopped taking your pain medications (usually two weeks after delivery).     Call your health care provider if you have any of these symptoms:       Increased pain, swelling, redness, or fluid around your stiches from an episiotomy or perineal tear.    A fever above 100.4 F (38 C) with or without chills when placing a thermometer under your tongue.    You soak a sanitary pad with blood within 1 hour, or you see blood clots larger than a golf ball.    Bleeding that lasts more than 6 weeks.    Vaginal discharge that smells bad.    Severe pain, cramping or tenderness in your lower belly area.    A need to urinate more frequently (use the toilet more often), more urgently (use the toilet very quickly), or it burns when you urinate.    Nausea and vomiting.    Redness, swelling or pain around a vein in your leg.    Problems breastfeeding or a red or painful area on your breast.    Chest pain and cough or are gasping for air.    Problems coping with sadness, anxiety, or depression.  If you have any concerns about hurting yourself or the baby, call your provider immediately.     You have questions or concerns after you return home.     Keep your hands clean:  Always wash your hands before touching your perineal area and stitches.  This helps reduce your risk of infection.  If your hands aren't dirty, you may use an alcohol hand-rub to clean your hands. Keep your nails clean and short.

## 2019-11-23 NOTE — LACTATION NOTE
Lactation in to see patient. Mother states milk is in. Nursed her other babies with no concerns. No questions at this time

## 2019-11-23 NOTE — PLAN OF CARE
VSS. Up ad meir. Voiding without difficulty. Patient denies pain and declines pain medications this shift. Breastfeeding well, independent with feedings. Bonding appropriately with infant and attentive to infant cares. Anticipated discharge today.

## 2019-11-23 NOTE — PROGRESS NOTES
[facility] Obstetrics Post-Partum Progress Note          Assessment and Plan:    Assessment:   Post-partum day #2  Normal spontaneous vaginal delivery  L&D complications: None      Doing well.  No excessive bleeding  Pain well-controlled.      Plan:   Ambulation encouraged  Breast feeding strategies discussed  Pain control measures as needed  Reportable signs and symptoms dicussed with the patient     Take your tempature twice daily for 3 days and call if > 100.4  Nothing in vagina for 6 wks  Continue taking prenatal MVI daily for 1 month    Discharge later today           Interval History:   Doing well.  Pain is well-controlled.  No fevers.  No history of foul-smelling vaginal discharge.  Good appetite.  Denies chest pain, shortness of breath, nausea or vomiting.  Vaginal bleeding is similar to a heavy menstrual flow.  Ambulatory.  Breastfeeding well.          Significant Problems:      Past Medical History:   Diagnosis Date     Depressive disorder     depression/anxiety     Kidney stone      Positive fetal fibronectin at 22 weeks to 34 weeks gestation 10/31/2019             Review of Systems:    The patient denies any chest pain, shortness of breath, excessive pain, fever, chills, purulent drainage from the wound, nausea or vomiting.          Medications:     All medications related to the patient's surgery have been reviewed  Current Facility-Administered Medications   Medication     acetaminophen (TYLENOL) tablet 650 mg     bisacodyl (DULCOLAX) Suppository 10 mg     Blood Bank will determine if patient is eligible for and the proper dosage of Rho (D) immune globulin (RhoGam)     carboprost (HEMABATE) injection 250 mcg     hydrocortisone 2.5 % cream     ibuprofen (ADVIL/MOTRIN) tablet 800 mg     lactated ringers BOLUS 1,000 mL     lanolin ointment     methylergonovine (METHERGINE) injection 200 mcg     misoprostol (CYTOTEC) tablet 800 mcg     naloxone (NARCAN) injection 0.1-0.4 mg     No MMR Needed -  Assessment: Patient does not need MMR vaccine     No Tdap Needed - Assessment: Patient does not need Tdap vaccine     oxytocin (PITOCIN) 30 units in 500 mL 0.9% NaCl infusion     oxytocin (PITOCIN) 30 units in 500 mL 0.9% NaCl infusion     oxytocin (PITOCIN) injection 10 Units     prenatal multivitamin w/iron per tablet 1 tablet     senna-docusate (SENOKOT-S/PERICOLACE) 8.6-50 MG per tablet 1 tablet    Or     senna-docusate (SENOKOT-S/PERICOLACE) 8.6-50 MG per tablet 2 tablet     sodium phosphate (FLEET ENEMA) 1 enema     tranexamic acid (CYKLOKAPRON) infusion 1 g             Physical Exam:   Vitals were reviewed  All vitals stable  Temp: 98.5  F (36.9  C) Temp src: Oral BP: 97/54 Pulse: 70 Heart Rate: 73 Resp: 16        Uterine fundus is firm, non-tender and at the level of the umbilicus          Data:     All laboratory data related to this surgery reviewed  Hemoglobin   Date Value Ref Range Status   11/22/2019 12.9 11.7 - 15.7 g/dL Final   10/31/2019 12.2 11.7 - 15.7 g/dL Final   09/25/2019 11.0 (L) 11.7 - 15.7 g/dL Final   05/09/2019 13.7 11.7 - 15.7 g/dL Final   04/07/2018 12.8 11.7 - 15.7 g/dL Final     No imaging studies have been ordered    Angel Melgar MD

## 2019-11-23 NOTE — DISCHARGE SUMMARY
[facility] Obstetrics Post-Partum Discharge Summary Note          Assessment and Plan:    Assessment:   Post-partum day #2  Normal spontaneous vaginal delivery  L&D complications: None      Doing well.  No excessive bleeding  Pain well-controlled.      Plan:   Ambulation encouraged  Breast feeding strategies discussed  Pain control measures as needed  Reportable signs and symptoms dicussed with the patient     Take your tempature twice daily for 3 days and call if > 100.4  Nothing in vagina for 6 wks  Continue taking prenatal MVI daily for 1 month    Discharge later today           Interval History:   Doing well.  Pain is well-controlled.  No fevers.  No history of foul-smelling vaginal discharge.  Good appetite.  Denies chest pain, shortness of breath, nausea or vomiting.  Vaginal bleeding is similar to a heavy menstrual flow.  Ambulatory.  Breastfeeding well.          Significant Problems:      Past Medical History:   Diagnosis Date     Depressive disorder     depression/anxiety     Kidney stone      Positive fetal fibronectin at 22 weeks to 34 weeks gestation 10/31/2019             Review of Systems:    The patient denies any chest pain, shortness of breath, excessive pain, fever, chills, purulent drainage from the wound, nausea or vomiting.          Medications:     All medications related to the patient's surgery have been reviewed  Current Facility-Administered Medications   Medication     acetaminophen (TYLENOL) tablet 650 mg     bisacodyl (DULCOLAX) Suppository 10 mg     Blood Bank will determine if patient is eligible for and the proper dosage of Rho (D) immune globulin (RhoGam)     carboprost (HEMABATE) injection 250 mcg     hydrocortisone 2.5 % cream     ibuprofen (ADVIL/MOTRIN) tablet 800 mg     lactated ringers BOLUS 1,000 mL     lanolin ointment     methylergonovine (METHERGINE) injection 200 mcg     misoprostol (CYTOTEC) tablet 800 mcg     naloxone (NARCAN) injection 0.1-0.4 mg     No MMR  Needed - Assessment: Patient does not need MMR vaccine     No Tdap Needed - Assessment: Patient does not need Tdap vaccine     oxytocin (PITOCIN) 30 units in 500 mL 0.9% NaCl infusion     oxytocin (PITOCIN) 30 units in 500 mL 0.9% NaCl infusion     oxytocin (PITOCIN) injection 10 Units     prenatal multivitamin w/iron per tablet 1 tablet     senna-docusate (SENOKOT-S/PERICOLACE) 8.6-50 MG per tablet 1 tablet    Or     senna-docusate (SENOKOT-S/PERICOLACE) 8.6-50 MG per tablet 2 tablet     sodium phosphate (FLEET ENEMA) 1 enema     tranexamic acid (CYKLOKAPRON) infusion 1 g             Physical Exam:   Vitals were reviewed  All vitals stable  Temp: 98.5  F (36.9  C) Temp src: Oral BP: 97/54 Pulse: 70 Heart Rate: 73 Resp: 16        Uterine fundus is firm, non-tender and at the level of the umbilicus          Data:     All laboratory data related to this surgery reviewed  Hemoglobin   Date Value Ref Range Status   11/22/2019 12.9 11.7 - 15.7 g/dL Final   10/31/2019 12.2 11.7 - 15.7 g/dL Final   09/25/2019 11.0 (L) 11.7 - 15.7 g/dL Final   05/09/2019 13.7 11.7 - 15.7 g/dL Final   04/07/2018 12.8 11.7 - 15.7 g/dL Final     No imaging studies have been ordered    Angel Melgar MD

## 2019-11-23 NOTE — PLAN OF CARE
Vital signs stable.  Pain managed with Tylenol and Ibuprofen when needed.  Pt is up ambulating, voiding without difficulty.  Independent in all cares for self and baby. Breastfeeding is going well, milk is in. Rhogam needed, given on 11/21.  Flu shot declined.  No sign/symptoms of infection noted.  Breast pump requested and given. Discharge instructions reviewed with pt.  All questions and concerns addressed. Pt verbalized understanding. Pt discharged home with all of her belongings in stable condition via ambulation accompanied by her SO at 10:15.

## 2019-11-23 NOTE — PLAN OF CARE
Patient is meeting expected goals for this evening. Passes an occasional clot when voiding - knows to call for many or large size. Has some cramping pain but declines the medication - offered occasionally. Independently caring for self and infant cares. Preparing for discharge to home tomorrow.

## 2020-01-03 PROBLEM — R87.89 POSITIVE FETAL FIBRONECTIN AT 22 WEEKS TO 34 WEEKS GESTATION: Status: RESOLVED | Noted: 2019-10-31 | Resolved: 2020-01-03

## 2020-01-03 PROBLEM — O47.03 PRETERM UTERINE CONTRACTIONS IN THIRD TRIMESTER, ANTEPARTUM: Status: RESOLVED | Noted: 2019-10-31 | Resolved: 2020-01-03

## 2020-01-03 PROBLEM — O26.899 RH NEGATIVE STATE IN ANTEPARTUM PERIOD: Status: RESOLVED | Noted: 2019-09-11 | Resolved: 2020-01-03

## 2020-01-03 PROBLEM — O99.330 TOBACCO SMOKING AFFECTING PREGNANCY, ANTEPARTUM: Status: RESOLVED | Noted: 2019-09-11 | Resolved: 2020-01-03

## 2020-01-03 PROBLEM — O09.899 POSITIVE FETAL FIBRONECTIN AT 22 WEEKS TO 34 WEEKS GESTATION: Status: RESOLVED | Noted: 2019-10-31 | Resolved: 2020-01-03

## 2020-01-03 PROBLEM — Z67.91 RH NEGATIVE STATE IN ANTEPARTUM PERIOD: Status: RESOLVED | Noted: 2019-09-11 | Resolved: 2020-01-03

## 2020-01-03 PROBLEM — O99.820 GBS (GROUP B STREPTOCOCCUS CARRIER), +RV CULTURE, CURRENTLY PREGNANT: Status: RESOLVED | Noted: 2019-11-01 | Resolved: 2020-01-03

## 2020-01-06 ENCOUNTER — PRENATAL OFFICE VISIT (OUTPATIENT)
Dept: OBGYN | Facility: CLINIC | Age: 35
End: 2020-01-06
Payer: COMMERCIAL

## 2020-01-06 VITALS
SYSTOLIC BLOOD PRESSURE: 98 MMHG | DIASTOLIC BLOOD PRESSURE: 60 MMHG | BODY MASS INDEX: 28.27 KG/M2 | WEIGHT: 144 LBS | HEIGHT: 60 IN

## 2020-01-06 DIAGNOSIS — Z01.812 PRE-PROCEDURE LAB EXAM: ICD-10-CM

## 2020-01-06 DIAGNOSIS — Z30.017 INSERTION OF IMPLANTABLE SUBDERMAL CONTRACEPTIVE: ICD-10-CM

## 2020-01-06 PROBLEM — Z36.89 ENCOUNTER FOR TRIAGE IN PREGNANT PATIENT: Status: RESOLVED | Noted: 2019-11-15 | Resolved: 2020-01-06

## 2020-01-06 LAB — HCG, QUAL URINE: NORMAL

## 2020-01-06 PROCEDURE — 84703 CHORIONIC GONADOTROPIN ASSAY: CPT | Performed by: OBSTETRICS & GYNECOLOGY

## 2020-01-06 PROCEDURE — 99207 ZZC POST PARTUM EXAM: CPT | Performed by: OBSTETRICS & GYNECOLOGY

## 2020-01-06 PROCEDURE — 11981 INSERTION DRUG DLVR IMPLANT: CPT | Performed by: OBSTETRICS & GYNECOLOGY

## 2020-01-06 ASSESSMENT — PATIENT HEALTH QUESTIONNAIRE - PHQ9: SUM OF ALL RESPONSES TO PHQ QUESTIONS 1-9: 0

## 2020-01-06 ASSESSMENT — MIFFLIN-ST. JEOR: SCORE: 1279.68

## 2020-01-06 NOTE — NURSING NOTE
Chief Complaint   Patient presents with     Postpartum Care     del , , Female, 6 # 2 oz       Initial BP 98/60 (BP Location: Right arm, Patient Position: Chair, Cuff Size: Adult Regular)   Ht 1.524 m (5')   BMI 30.86 kg/m   Estimated body mass index is 30.86 kg/m  as calculated from the following:    Height as of this encounter: 1.524 m (5').    Weight as of 19: 71.7 kg (158 lb).  BP completed using cuff size: regular    Questioned patient about current smoking habits.  Pt. has never smoked.          The following HM Due: NONE    Lindsay Argueta, CMA

## 2020-01-06 NOTE — PROGRESS NOTES
"  Nexplanon Insertion:    Is a pregnancy test required: {Pregnancy test required:691229}  Was a consent obtained?  {Yes/No:233752::\"Yes\"}    Subjective: Karely Munoz is a 34 year old  presents for Nexplanon.    Patient has been given the opportunity to ask questions about all forms of birth control, including all options appropriate for Karely Munoz. Discussed that no method of birth control, except abstinence is 100% effective against pregnancy or sexually transmitted infection.     Karely Munoz understands she may have the Nexplanon removed at any time and it should be removed by a health care provider.    The entire insertion procedure was reviewed with the patient, including care after placement.    No LMP recorded. {last sex (Optional):308593}. No allergy to betadine or shellfish. {std screenin}  HCG Qual Urine   Date Value Ref Range Status   2016 Negative NEG Final         BP 98/60 (BP Location: Right arm, Patient Position: Chair, Cuff Size: Adult Regular)   Ht 1.524 m (5')   BMI 30.86 kg/m      PROCEDURE NOTE: -- Nexplanon Insertion    Reason for Insertion: {IUD reasons:153864::\"contraception\"}    Patient was placed supine with {left/right:927212} arm exposed.  Melania was made 8-10 cm above medial epicondyle and a guiding melania 4 cm above the first.  Arm was prepped with {cleansing agents:695345::\"Betadine\"}. Insertion point was anesthetized with *** mL 1% lidocaine. After stretching the skin with thumb and index finger around the insertion site, skin punctured with the tip of the needle inserted at 30 degrees and then lowered to horizontal position. The needle was then advanced to its full length. Applicator was then stabilized and slider was unlocked. Slider was pulled back until it stopped and then removed.    Correct placement of the implant was confirmed by palpation in the patient's arm and visualizing the purple top of the obturator.   Bandage and pressure dressing applied to " insertion site.    Lot # ***  Exp: ***    EBL: minimal    Complications: none    ASSESSMENT:     ICD-10-CM    1. Routine postpartum follow-up Z39.2    2. Insertion of implantable subdermal contraceptive Z30.017 etonogestrel (IMPLANON/NEXPLANON) 68 MG IMPL     etonogestrel (IMPLANON/NEXPLANON) subdermal implant 68 mg     INSERTION NON-BIODEGRADABLE DRUG DELIVERY IMPLANT   3. Pre-procedure lab exam Z01.812 HCL HCG, URINE, NURSE BACKOFFICE        PLAN:    Given 's handouts, including when to have Nexplanon removed, list of danger s/sx, side effects and follow up recommended. Encouraged condom use for prevention of STD. Back up contraception advised for 7 days. Advised to call for any fever, for prolonged or severe pain or bleeding, abnormal vaginal dischage. She was advised to use pain medications (ibuprofen) as needed for mild to moderate pain.     Vonnie Cortes MD

## 2020-01-06 NOTE — PROGRESS NOTES
SUBJECTIVE:  Karely Munoz,  is here for a postpartum visit.  She had a  on 19 delivering a healthy baby girl, named Ron weighing 6 lbs 2 oz at term.      HPI:  Doing well.  Breastfeeding.  Interested in birth control that doesn't interfere with that, for one of her babies she went on the nuvaring and it decreased her milk supply.  She presented twice before to have an IUD inserted and it didn't work; doesn't think that would be a good route to go.   Mood is good  Here today with baby and FOB    Last PHQ-9 score on record=   PHQ-9 SCORE 2020   PHQ-9 Total Score 0     No flowsheet data found.    Pap: 2019 Neg, Neg  Lab Results   Component Value Date    PAP NIL, neg HPV 2019   Delivery complications:  No  Breast feeding:  Yes, exclusively  Bladder problems:  No  Bowel problems/hemorrhoids:  No  Episiotomy/laceration/incision healed? Yes:   Vaginal flow:  None  [place]:  Yes, went well  Contraception: would like to discuss  Emotional adjustment:  doing well and happy  Back to work:  Yes 19    12 point review of systems negative other than symptoms noted below or in the HPI.    OBJECTIVE:  Vitals: BP 98/60 (BP Location: Right arm, Patient Position: Chair, Cuff Size: Adult Regular)   Ht 1.524 m (5')   BMI 30.86 kg/m    BMI= Body mass index is 30.86 kg/m .  General - pleasant female in no acute distress.  Breast -  deferred  Abdomen - No incision  Rectovaginal - deferred.    Nexplanon insertion procedure note  2020     INDICATIONS:                                                      Patient presents for placement of Nexplanon for contraception.  She has had been counseled on side effects, risks, benefits and alternatives - including risk of abnormal uterine bleeding.  Patient desires to proceed.    Is a pregnancy test required: Yes.  Was it positive or negative?  Negative  Was a consent obtained?  Yes    Consent:  Risks, benefits of treatment, and alternative options  for contraception were discussed.  Discussed retaining the device for 3-6 months even if an abnormal bleeding pattern occurs and attempting to treat through.  She agrees.  Patient's questions were elicited and answered.  Written consent was obtained and scanned into medical record.     PROCEDURE:                                                      Patient was resting comfortably on exam table, right arm placed at shoulder level in a 90 degree angle.  Skin was marked 8cm from epicondyl and cleansed with betadine solution.  Insertion site and track infiltrated with 6 cc 1% Lidocaine.      Nexplanon device visualized in applicator by patient and provider.  Skin punctured with applicator at insertion site and advanced with ease in the subdermal space.  Applicator was removed.  Nexplanon was palpated by provider and patient.    Small amount of bleeding noted at insertion site and slight bruising noted along track of Nexplanon.  Bandage and pressure dressing applied to insertion site.         POST PROCEDURE:                                                      To be removed/replaced within three years. Written and verbal instructions provided to patient.  She tolerated the procedure well. There were no complications. Patient was discharged in stable condition.    Keep dressing on and dry for 24 hours, then remove wrap.  Replace bandaid daily for 5 days. Keep your user card in a safe place where you'll remember it.  Make note of today's date for removal/replacement of your Nexplanon in 3 years. Call us if there is any redness, tenderness, warmth or drainage from the area of your Nexplanon insertion. Use a backup method of birth control for 7 days.      Nexplanon  Lot # [id number]  Exp: 5/22  Vonnie Cortes MD     ASSESSMENT/PLAN:  May resume normal activities without restrictions.  Pap smear was not done today.  Full counseling was provided, and all questions were answered.   Return to clinic in one year for an annual  visit.     Vonnie Cortes MD

## 2020-06-22 ENCOUNTER — E-VISIT (OUTPATIENT)
Dept: OBGYN | Facility: CLINIC | Age: 35
End: 2020-06-22
Payer: COMMERCIAL

## 2020-06-22 DIAGNOSIS — B37.31 YEAST INFECTION OF THE VAGINA: Primary | ICD-10-CM

## 2020-06-22 RX ORDER — FLUCONAZOLE 150 MG/1
TABLET ORAL
Qty: 2 TABLET | Refills: 0 | Status: SHIPPED | OUTPATIENT
Start: 2020-06-22 | End: 2020-09-08

## 2020-06-22 NOTE — TELEPHONE ENCOUNTER
I sent Rx Diflucan 150 mg for today, and repeat dose in 3 days, to her pharm.      Provider E-Visit time total (minutes): 3

## 2020-06-22 NOTE — PATIENT INSTRUCTIONS
Yeast Infection (Candida Vaginal Infection)    You have a Candida vaginal infection. This is also known as a yeast infection. It is most often caused by a type of yeast (fungus) called Candida. Candida are normally found in the vagina. But if they increase in number, this can lead to infection and cause symptoms.  Symptoms of a yeast infection can include:    Clumpy or thin, white discharge, which may look like cottage cheese    Itching or burning    Burning with urination  Certain factors can make a yeast infection more likely. These can include:    Taking certain medicines, such as antibiotics or birth control pills    Pregnancy    Diabetes    Weak immune system  A yeast infection is most often treated with antifungal medicine. This may be given as a vaginal cream or pills you take by mouth. Treatment may last for about 1 to 7 days. Women with severe or recurrent infections may need longer courses of treatment.  Home care    If you re prescribed medicine, be sure to use it as directed. Finish all of the medicine, even if your symptoms go away. Note: Don t try to treat yourself using over-the-counter products without talking to your provider first. He or she will let you know if this is a good option for you.    Ask your provider what steps you can take to help reduce your risk of having a yeast infection in the future.  Follow-up care  Follow up with your healthcare provider, or as directed.  When to seek medical advice  Call your healthcare provider right away if:    You have a fever of 100.4 F (38 C) or higher, or as directed by your provider.    Your symptoms worsen, or they don t go away within a few days of starting treatment.    You have new pain in the lower belly or pelvic region.    You have side effects that bother you or a reaction to the cream or pills you re prescribed.    You or any partners you have sex with have new symptoms, such as a rash, joint pain, or sores.  Date Last Reviewed:  10/1/2017    1487-7422 [employer]. [address] [place] [address], [place] [address]. All rights reserved. This information is not intended as a substitute for professional medical care. Always follow your healthcare professional's instructions.

## 2020-09-08 ENCOUNTER — OFFICE VISIT (OUTPATIENT)
Dept: OBGYN | Facility: CLINIC | Age: 35
End: 2020-09-08
Payer: COMMERCIAL

## 2020-09-08 VITALS
BODY MASS INDEX: 32.2 KG/M2 | HEIGHT: 60 IN | DIASTOLIC BLOOD PRESSURE: 70 MMHG | WEIGHT: 164 LBS | SYSTOLIC BLOOD PRESSURE: 120 MMHG

## 2020-09-08 DIAGNOSIS — Z30.46 ENCOUNTER FOR NEXPLANON REMOVAL: Primary | ICD-10-CM

## 2020-09-08 PROBLEM — Z30.017 INSERTION OF IMPLANTABLE SUBDERMAL CONTRACEPTIVE: Status: RESOLVED | Noted: 2020-01-06 | Resolved: 2020-09-08

## 2020-09-08 PROCEDURE — 11982 REMOVE DRUG IMPLANT DEVICE: CPT | Performed by: OBSTETRICS & GYNECOLOGY

## 2020-09-08 ASSESSMENT — MIFFLIN-ST. JEOR: SCORE: 1370.4

## 2020-09-08 NOTE — PROGRESS NOTES
2% Xylocaine with Epinephrine used for block    Lot: [id number]  Exp: 04/21  NDC: 73844-208-86  Lesia Du CMA

## 2020-09-08 NOTE — PROGRESS NOTES
SUBJECTIVE:                                                   CC:  Patient presents with:  Contraception: remove Nexplanon--right side of body has been tingling--weight gain--spotting constantly--discuss birth control      HPI:  Karely Munoz is a 34 year old  with the nexplanon in place who presents for removal.  Has had some weight gain.  No bleeding the first two months, has had irregular bleeding for the last 6 months.    Current smoker.  Planning one more child before doing anything permanent.  Not interested in OCP.     ROS: 10 point ROS negative other than as listed above in HPI.    Gyn History:  No LMP recorded. (Menstrual status: Breast Feeding).        Last 3 Pap and HPV Results:   PAP / HPV 2019   PAP NIL, neg HPV       PMH, PSH, Soc Hx, Fam Hx, Meds, and allergies reviewed in Epic.  Denies family history of gyn cancer    OBJECTIVE:     /70   Ht 1.524 m (5')   Wt 74.4 kg (164 lb)   BMI 32.03 kg/m      Gen: Healthy appearing female, no acute distress, comfortable    Nexplanon removal procedure note  2020     INDICATIONS:                                                      Karely is here today for removal of Nexplanon contraceptive implant.     PROCEDURE:                                                      Patient was placed in dorsal supine position with right arm abducted and externally rotated. Nexplanon was palpated under skin. The area was cleansed with betadine. The distal site was injected with 5 ml of 2% lidocaine.  While pushing down on the proximal end, 2 mm incision was made over the distal implant with a scalpel. The implant was grasped with a mosquito forceps and removed intact. The skin was closed with bandaid. A pressure bandage was placed for the next 12-24 hours.  She tolerated the procedure well. There were no complications. Patient was discharged in stable condition.    Call if bleeding, pain or fever occur., Birth control counseling given., Pregnancy  counseling given, including folic acid supplementation 800-1000 mg per day. and Use of condoms/foam discussed.    Vonnie Cortes MD         ASSESSMENT/PLAN:                                                      1. Encounter for Nexplanon removal  Interested in maybe one more child before age 35.  Not interested in OCP.  Discussed prenatal vitamin.  Planning condoms until desiring pregnancy again.   - REMOVAL NEXPLANON       Vonnie Cortes MD, MPH  Obstetrics and Gynecology

## 2020-09-08 NOTE — NURSING NOTE
Chief Complaint   Patient presents with     Contraception     remove Nexplanon--right side of body has been tingling--weight gain--spotting constantly--discuss birth control       Initial /70   Ht 1.524 m (5')   Wt 74.4 kg (164 lb)   BMI 32.03 kg/m   Estimated body mass index is 32.03 kg/m  as calculated from the following:    Height as of this encounter: 1.524 m (5').    Weight as of this encounter: 74.4 kg (164 lb).  BP completed using cuff size: regular    Questioned patient about current smoking habits.  Pt. currently smokes.  Advised about smoking cessation.          The following HM Due: NONE

## 2020-09-25 ENCOUNTER — APPOINTMENT (OUTPATIENT)
Dept: GENERAL RADIOLOGY | Facility: CLINIC | Age: 35
End: 2020-09-25
Attending: PHYSICIAN ASSISTANT
Payer: COMMERCIAL

## 2020-09-25 ENCOUNTER — HOSPITAL ENCOUNTER (EMERGENCY)
Facility: CLINIC | Age: 35
Discharge: HOME | End: 2020-09-26
Attending: PHYSICIAN ASSISTANT | Admitting: PHYSICIAN ASSISTANT
Payer: COMMERCIAL

## 2020-09-25 VITALS
OXYGEN SATURATION: 98 % | SYSTOLIC BLOOD PRESSURE: 123 MMHG | TEMPERATURE: 98.2 F | HEART RATE: 97 BPM | RESPIRATION RATE: 20 BRPM | DIASTOLIC BLOOD PRESSURE: 71 MMHG
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
## 2020-09-25 DIAGNOSIS — M25.531 PAIN IN BOTH WRISTS: ICD-10-CM

## 2020-09-25 DIAGNOSIS — M25.532 PAIN IN BOTH WRISTS: ICD-10-CM

## 2020-09-25 PROCEDURE — 99284 EMERGENCY DEPT VISIT MOD MDM: CPT

## 2020-09-25 PROCEDURE — 73110 X-RAY EXAM OF WRIST: CPT | Mod: 50

## 2020-09-25 PROCEDURE — 29125 APPL SHORT ARM SPLINT STATIC: CPT | Mod: LT

## 2020-09-25 ASSESSMENT — ENCOUNTER SYMPTOMS
ARTHRALGIAS: 1
MYALGIAS: 1
NUMBNESS: 0

## 2020-09-25 NOTE — ED AVS SNAPSHOT
[facility] Emergency Department  [address]  [facility] [address]  Phone:  [phone]  Fax:  [phone]                                    Karely Munoz   MRN: [id number]    Department:  [facility] Emergency Department   Date of Visit:  9/25/2020           After Visit Summary Signature Page    I have received my discharge instructions, and my questions have been answered. I have discussed any challenges I see with this plan with the nurse or doctor.    ..........................................................................................................................................  Patient/Patient Representative Signature      ..........................................................................................................................................  Patient Representative Print Name and Relationship to Patient    ..................................................               ................................................  Date                                   Time    ..........................................................................................................................................  Reviewed by Signature/Title    ...................................................              ..............................................  Date                                               Time          22EPIC Rev 08/18

## 2020-09-26 ASSESSMENT — ENCOUNTER SYMPTOMS
BACK PAIN: 0
NECK PAIN: 0
JOINT SWELLING: 0
HEADACHES: 0

## 2020-09-26 NOTE — ED PROVIDER NOTES
History     Chief Complaint:  Wrist Pain      HPI   Karely Munoz is a 34 year old female who presents for the evaluation of wrist pain. The patient reports that earlier tonight she experienced a mechanical fall after tripping over a fire pit and that she landed on her bilateral wrists. The patient notes that she has been experiencing bilateral wrist pain since the fall, prompting her to the ED. She describes that her pain is worse in her right wrist. The patient denies numbness and other issues or injuries.     Allergies:  No known drug allergies      Medications:    The patient is not currently taking any prescribed medications.     Past Medical History:    Depression  Kidney stones  Positive fetal fibronectin    Past Surgical History:    History reviewed. No pertinent surgical history.     Family History:    Hearing loss - mother    Social History:  Smoking status: Current every day smoker, PPD: 0.25  Alcohol use: No  Drug use: No  PCP: Kandi Cervantes [place]   Marital Status:   [4]     Review of Systems   Eyes: Negative for visual disturbance.   Musculoskeletal: Positive for arthralgias and myalgias. Negative for back pain, gait problem, joint swelling and neck pain.   Neurological: Negative for numbness and headaches.   All other systems reviewed and are negative.    Physical Exam     Patient Vitals for the past 24 hrs:   BP Temp Temp src Pulse Resp SpO2   09/25/20 2324 123/71 98.2  F (36.8  C) Oral 97 20 98 %      Physical Exam  Vitals signs and nursing note reviewed.   Constitutional:       General: She is not in acute distress.     Appearance: She is not diaphoretic.   HENT:      Head: Normocephalic and atraumatic.   Eyes:      General: No scleral icterus.     Extraocular Movements: Extraocular movements intact.   Cardiovascular:      Rate and Rhythm: Normal rate and regular rhythm.      Pulses: Normal pulses.   Pulmonary:      Effort: Pulmonary effort is normal. No respiratory distress.    Musculoskeletal:         General: Tenderness present. No deformity.      Comments: No obvious deformity noted to the BUE. Minimal abrasions of the bilateral palms. She voices pain to the R anataomical snuffbox and has tenderness to palpation.Baseline ROM,strength, sensation of the wrist and hand are noted. 2 radial pulse.   No obvious tenderness on examination of the L wrist. Baseline ROM,strength, sensation of the wrist and hand are noted. 2 radial pulse.    Skin:     General: Skin is warm.      Findings: No rash.   Neurological:      Mental Status: She is alert.       Emergency Department Course   Imaging:  Radiographic findings were communicated with the patient who voiced understanding of the findings.  XR Wrist Bilateral G/E 3 Views   XR Wrist Bilateral G/E 3 Views    (Results Pending)      Emergency Department Course:  Past medical records, nursing notes, and vitals reviewed.  2330: I performed an exam of the patient and obtained history, as documented above.     The patient was sent for a bilateral wrist x ray while in the emergency department, findings above.    0015: I rechecked the patient. Explained findings to patient.     Findings and plan explained to the Patient. Patient discharged home with instructions regarding supportive care, medications, and reasons to return. The importance of close follow-up was reviewed.       Impression & Plan    Medical Decision Making:  No neurovascular deficits. There is R sided anatomical snuffbox tenderness/pain. Consideration for scaphoid injury prompting thumb spica splint placement.Imaging demonstrates people for scaphoid injury as well otherwise unremarkable. Outpatient orthopedic follow up    Diagnosis:    ICD-10-CM    1. Fall, initial encounter  W19.XXXA    2. Pain in both wrists  M25.531     M25.532        Disposition:  Discharged to home.    Discharge Medications:  New Prescriptions    No medications on file     Scribe Disclosure:  Angel MCCORMICK am  serving as a scribe at 11:29 PM on 9/25/2020 to document services personally performed by David Rivas PA-C based on my observations and the provider's statements to me.      Angel Lu  9/25/2020   [facility] EMERGENCY DEPARTMENT       David Rivas PA-C  09/26/20 1509

## 2020-10-08 ENCOUNTER — HOSPITAL ENCOUNTER (OUTPATIENT)
Dept: BEHAVIORAL HEALTH | Facility: CLINIC | Age: 35
End: 2020-10-08
Attending: PSYCHIATRY & NEUROLOGY
Payer: COMMERCIAL

## 2020-10-08 PROCEDURE — 999N000216 HC STATISTIC ADULT CD FACE TO FACE-NO CHRG: Mod: GT | Performed by: SOCIAL WORKER

## 2020-10-08 NOTE — PROGRESS NOTES
Client entered MY CHART hoping to speak to provider about medication options for fibromyalgia and anxiety. She reported she has tried Cymbalta 3 times and it has not been effective and also she has had negative side effects- such as feeling a brain fog, sluggishness, and limited improvement in pain. She is wanting to speak to a provider about alternative options for medication management.      In the past she has had [employer] insurance but recently changed to [facility] and is beginning to establish with new providers. She has a family practice MD appointment on Oct. 20, 2020. We reviewed her options. She is not interested in individual therapy at this time as she is currently doing marital therapy and has 5 children to manage including a 10 month old. She denied any suicidal ideation and stated that she has never had any suicidal thinking and has protective factors including: her children and family and desire to seek care and improve her pain/anxiety.    We discussed waiting until Oct., 20 to talk to her new family practice provider but determined she would like to see a psychiatrist sooner if possible. To avoid having to make her wait further, she was connected  directly to intake,  as she has multiple scheduling conflicts, to assist her to find psychiatry through [facility].

## 2020-10-20 ENCOUNTER — OFFICE VISIT (OUTPATIENT)
Dept: FAMILY MEDICINE | Facility: CLINIC | Age: 35
End: 2020-10-20
Payer: COMMERCIAL

## 2020-10-20 VITALS
BODY MASS INDEX: 32.45 KG/M2 | WEIGHT: 165.3 LBS | TEMPERATURE: 98.4 F | RESPIRATION RATE: 16 BRPM | HEIGHT: 60 IN | DIASTOLIC BLOOD PRESSURE: 68 MMHG | HEART RATE: 72 BPM | SYSTOLIC BLOOD PRESSURE: 112 MMHG

## 2020-10-20 DIAGNOSIS — L98.9 SKIN LESION: Primary | ICD-10-CM

## 2020-10-20 DIAGNOSIS — Z13.9 SCREENING FOR CONDITION: ICD-10-CM

## 2020-10-20 PROCEDURE — 82728 ASSAY OF FERRITIN: CPT | Performed by: FAMILY MEDICINE

## 2020-10-20 PROCEDURE — 84443 ASSAY THYROID STIM HORMONE: CPT | Performed by: FAMILY MEDICINE

## 2020-10-20 PROCEDURE — 36415 COLL VENOUS BLD VENIPUNCTURE: CPT | Performed by: FAMILY MEDICINE

## 2020-10-20 PROCEDURE — 99203 OFFICE O/P NEW LOW 30 MIN: CPT | Performed by: FAMILY MEDICINE

## 2020-10-20 PROCEDURE — 82306 VITAMIN D 25 HYDROXY: CPT | Performed by: FAMILY MEDICINE

## 2020-10-20 RX ORDER — LORAZEPAM 0.5 MG/1
0.5 TABLET ORAL EVERY 6 HOURS PRN
COMMUNITY
End: 2023-08-30

## 2020-10-20 RX ORDER — NORTRIPTYLINE HCL 10 MG
10 CAPSULE ORAL AT BEDTIME
COMMUNITY
End: 2021-10-18

## 2020-10-20 ASSESSMENT — MIFFLIN-ST. JEOR: SCORE: 1376.3

## 2020-10-20 ASSESSMENT — ENCOUNTER SYMPTOMS
COLOR CHANGE: 1
FEVER: 0

## 2020-10-20 NOTE — PROGRESS NOTES
Subjective     Karely Munoz is a 34 year old female who presents to clinic today for the following health issues:    HPI         Patient is a pleasant 34-year old female with history of fibromyalgia currently managed on lorazepam and Pamelor who presents to clinic with concern for skin changes.  She believes there are 3 moles on the right side of her rib cage, 1 has gotten larger in size and has become darker, occasionally irritable and catching on her bra strap.  There has been no fever or unintentional weight loss.    Additionally, she requested that we perform labs for her today.  States that her psychologist recommended she gets ferritin, TSH and her vitamin D levels checked.  She states she is not sure why the psychologist has not checked the labs herself.      No future appointments.  Appointment Notes for this encounter:   Reviewed - REI. A mole that has changed in size and color  and is irritable    Health Maintenance Due   Topic Date Due     ANNUAL REVIEW OF  ORDERS  1986     Pneumococcal Vaccine: Pediatrics (0 to 5 Years) and At-Risk Patients (6 to 64 Years) (1 of 1 - PPSV23) 12/20/1992     INFLUENZA VACCINE (1) 09/01/2020     Health Maintenance addressed:  Flu Vaccine    Immunizations Pt declined    MyChart Status:  Active and Using    Review of Systems   Constitutional: Negative for fever.   Skin: Positive for color change.            Objective    /68 (BP Location: Right arm, Patient Position: Sitting, Cuff Size: Adult Regular)   Pulse 72   Temp 98.4  F (36.9  C) (Oral)   Resp 16   Ht 1.524 m (5')   Wt 75 kg (165 lb 4.8 oz)   Breastfeeding Yes   BMI 32.28 kg/m    Body mass index is 32.28 kg/m .  Physical Exam  Vitals signs reviewed.   Constitutional:       Appearance: She is not ill-appearing.   Skin:     Comments: 0.5 x 0.5 cm skin color hypertrophic lesion on the anterior axillary line at the mid right rib cage.            Assessment & Plan     Skin lesion  Possibly atypical nevi  versus acrochordon, we discussed benefits risk and alternative of skin biopsy versus serial monitoring.  She would like to proceed with a skin lesion removal.  Discussed risk of pain, bleeding infection.  She is agreeable, we will set patient up for procedure only visit.    Screening for condition  Additional management per psychiatry.  - Vitamin D Deficiency  - TSH with free T4 reflex  - Ferritin      Return in about 1 month (around 11/20/2020).    Eric Jones MD  [facility]

## 2020-10-21 LAB — DEPRECATED CALCIDIOL+CALCIFEROL SERPL-MC: 23 UG/L (ref 20–75)

## 2020-10-22 LAB
FERRITIN SERPL-MCNC: 25 NG/ML (ref 12–150)
TSH SERPL DL<=0.005 MIU/L-ACNC: 0.8 MU/L (ref 0.4–4)

## 2020-11-23 NOTE — PROGRESS NOTES
Pre-Visit Planning   Next 5 appointments (look out 90 days)    Nov 25, 2020  9:10 AM  (Arrive by 8:50 AM)  Dermatology Procedure with Eric Jones MD  [facility] ([facility] [address] [address] [phone]        Appointment Notes for this encounter: Shave biopsy - zg.     Questionnaires Reviewed/Assigned  No additional questionnaires are needed    Patient contact not needed. follow up appt     Iwona Lopez RN

## 2020-11-25 ENCOUNTER — OFFICE VISIT (OUTPATIENT)
Dept: FAMILY MEDICINE | Facility: CLINIC | Age: 35
End: 2020-11-25
Payer: COMMERCIAL

## 2020-11-25 VITALS
HEIGHT: 63 IN | TEMPERATURE: 98.5 F | BODY MASS INDEX: 28.62 KG/M2 | SYSTOLIC BLOOD PRESSURE: 96 MMHG | WEIGHT: 161.5 LBS | OXYGEN SATURATION: 98 % | HEART RATE: 92 BPM | DIASTOLIC BLOOD PRESSURE: 64 MMHG | RESPIRATION RATE: 20 BRPM

## 2020-11-25 DIAGNOSIS — L98.9 SKIN LESION: Primary | ICD-10-CM

## 2020-11-25 PROCEDURE — 88305 TISSUE EXAM BY PATHOLOGIST: CPT | Performed by: DERMATOLOGY

## 2020-11-25 PROCEDURE — 11104 PUNCH BX SKIN SINGLE LESION: CPT | Performed by: FAMILY MEDICINE

## 2020-11-25 ASSESSMENT — MIFFLIN-ST. JEOR: SCORE: 1406.69

## 2020-11-25 NOTE — PROGRESS NOTES
Subjective     Karely Munoz is a 34 year old female who presents to clinic today for the following health issues:    History of Present Illness       She eats 2-3 servings of fruits and vegetables daily.She consumes 0 sweetened beverage(s) daily.She exercises with enough effort to increase her heart rate 30 to 60 minutes per day.  She exercises with enough effort to increase her heart rate 5 days per week.   She is taking medications regularly.           Pt is here for a biopsy procedure.    {additonal problems for provider to add (Optional):468759}    Review of Systems   {ROS COMP (Optional):753699}      Objective    There were no vitals taken for this visit.  There is no height or weight on file to calculate BMI.  Physical Exam   {Exam List (Optional):249248}    {Diagnostic Test Results (Optional):403224}        {PROVIDER CHARTING PREFERENCE:556161}

## 2020-11-25 NOTE — PROCEDURES
PROCEDURE: skin biopsy  Performing Physician: Eric Jones MD       PROCEDURE:     Punch 5 mm    Location/description: 4 x 5 mm exophytic hyperpigmented lesion below the right lower rib cage at the mid clavicular line    The area surrounding the skin lesion was prepared and draped in the usual sterile manner.  0.5 cc of 1% lidocaine with epinephrine was used for and had satisfactory anesthesia. The lesion was removed in the usual manner by the biopsy method noted above. Hemostasis was assured with gauze and pressure. The patient tolerated the procedure well.  Estimated bleeding, less than 1 cc.  Specimen sent to pathology.     Closure:     1 x 4-0  suture      Followup: The patient tolerated the procedure well without complications.  Standard post-procedure care is explained and return precautions are given.  Form to come back in 1 week for suture removal.

## 2020-11-30 LAB — COPATH REPORT: NORMAL

## 2020-12-02 ENCOUNTER — ALLIED HEALTH/NURSE VISIT (OUTPATIENT)
Dept: FAMILY MEDICINE | Facility: CLINIC | Age: 35
End: 2020-12-02
Payer: COMMERCIAL

## 2020-12-02 DIAGNOSIS — Z48.02 VISIT FOR SUTURE REMOVAL: Primary | ICD-10-CM

## 2020-12-02 PROCEDURE — 99207 PR NO CHARGE NURSE ONLY: CPT

## 2020-12-02 NOTE — PROGRESS NOTES
Pt in after have skin lesion removed on 11/25    Wound is well healed with no signs of infection     1 suture removed without issue.       Pt to f/u as needed    Iwona Lopez, RN

## 2021-01-14 ENCOUNTER — HEALTH MAINTENANCE LETTER (OUTPATIENT)
Age: 36
End: 2021-01-14

## 2021-02-03 ENCOUNTER — TRANSFERRED RECORDS (OUTPATIENT)
Dept: HEALTH INFORMATION MANAGEMENT | Facility: CLINIC | Age: 36
End: 2021-02-03

## 2021-03-02 NOTE — PROGRESS NOTES
Pre-Visit Planning   Next 5 appointments (look out 90 days)    Mar 03, 2021  Arrive by 1:20 PM  Office Visit with Eric Jones MD  [facility] ([facility] ) [address] [address] [phone]        Appointment Notes for this encounter:   LAB FIRST  poss UTI    Questionnaires Reviewed/Assigned  No additional questionnaires are needed        Patient preferred phone number: [phone]    Patient contact not needed. Scheduled day before appt.

## 2021-03-03 ENCOUNTER — OFFICE VISIT (OUTPATIENT)
Dept: FAMILY MEDICINE | Facility: CLINIC | Age: 36
End: 2021-03-03
Payer: COMMERCIAL

## 2021-03-03 VITALS
TEMPERATURE: 98.2 F | WEIGHT: 167 LBS | OXYGEN SATURATION: 98 % | BODY MASS INDEX: 29.59 KG/M2 | HEIGHT: 63 IN | SYSTOLIC BLOOD PRESSURE: 104 MMHG | HEART RATE: 80 BPM | DIASTOLIC BLOOD PRESSURE: 60 MMHG | RESPIRATION RATE: 20 BRPM

## 2021-03-03 DIAGNOSIS — R39.9 LOWER URINARY TRACT SYMPTOMS (LUTS): Primary | ICD-10-CM

## 2021-03-03 LAB
ALBUMIN UR-MCNC: NEGATIVE MG/DL
APPEARANCE UR: CLEAR
BACTERIA #/AREA URNS HPF: ABNORMAL /HPF
BILIRUB UR QL STRIP: NEGATIVE
COLOR UR AUTO: YELLOW
GLUCOSE UR STRIP-MCNC: NEGATIVE MG/DL
HGB UR QL STRIP: ABNORMAL
KETONES UR STRIP-MCNC: NEGATIVE MG/DL
LEUKOCYTE ESTERASE UR QL STRIP: NEGATIVE
MUCOUS THREADS #/AREA URNS LPF: PRESENT /LPF
NITRATE UR QL: NEGATIVE
NON-SQ EPI CELLS #/AREA URNS LPF: ABNORMAL /LPF
PH UR STRIP: 6 PH (ref 5–7)
RBC #/AREA URNS AUTO: ABNORMAL /HPF
SOURCE: ABNORMAL
SP GR UR STRIP: 1.02 (ref 1–1.03)
UROBILINOGEN UR STRIP-ACNC: 0.2 EU/DL (ref 0.2–1)
WBC #/AREA URNS AUTO: ABNORMAL /HPF

## 2021-03-03 PROCEDURE — 99213 OFFICE O/P EST LOW 20 MIN: CPT | Performed by: FAMILY MEDICINE

## 2021-03-03 PROCEDURE — 87086 URINE CULTURE/COLONY COUNT: CPT | Performed by: FAMILY MEDICINE

## 2021-03-03 PROCEDURE — 81001 URINALYSIS AUTO W/SCOPE: CPT | Performed by: FAMILY MEDICINE

## 2021-03-03 ASSESSMENT — ENCOUNTER SYMPTOMS
FLANK PAIN: 0
FEVER: 0
NAUSEA: 0

## 2021-03-03 ASSESSMENT — MIFFLIN-ST. JEOR: SCORE: 1426.64

## 2021-03-03 NOTE — PROGRESS NOTES
Assessment & Plan     Lower urinary tract symptoms (LUTS)  Acute problem.  Patient is a pleasant -1-0-4 with 4 vaginal deliveries who has lower urinary tract symptoms with urinalysis that is negative for acute UTI, I did send it in for a culture for reassurance.  She is a smoker and has hematuria on her urine however she is just started her menses.  It sounds like she needs urodynamic testing for further evaluation.  Consider interstitial cystitis.  Will refer to urogynecology.  - *UA reflex to Microscopic and Culture ([place] and [facility] (except [place] and [place])  - Urine Microscopic  - Urine Culture Aerobic Bacterial  - UROLOGY ADULT REFERRAL; Future        Return in about 3 days (around 3/6/2021).  If symptoms not improved    Eric Jones MD  [facility]    Felice Cali is a 35 year old who presents for the following health issues   HPI       Genitourinary - Female  Onset/Duration: x1 week  Description:   Painful urination (Dysuria): no           Frequency: YES- more  Blood in urine (Hematuria): no  Delay in urine (Hesitency): YES  Intensity: 4/10 at worst  Progression of Symptoms:  same and constant  Accompanying Signs & Symptoms:  Fever/chills: no  Flank pain: no  Nausea and vomiting: no  Vaginal symptoms: itching  Abdominal/Pelvic Pain: YES  History:   History of frequent UTI s: YES  History of kidney stones: YES- 9 years ago  Sexually Active: YES  Possibility of pregnancy: Yes, but period started this morning.  Precipitating or alleviating factors: None  Therapies tried and outcome: Cranberry juice prn (contraindicated in Coumadin patients) and Increase fluid intake      Review of Systems   Constitutional: Negative for fever.   Gastrointestinal: Negative for nausea.   Genitourinary: Negative for flank pain.            Objective    /60 (BP Location: Right arm, Patient Position: Sitting, Cuff Size: Adult Regular)   Pulse 80   Temp 98.2  F (36.8  C)  "(Oral)   Resp 20   Ht 1.6 m (5' 3\")   Wt 75.8 kg (167 lb)   LMP 03/03/2021   SpO2 98%   BMI 29.58 kg/m    Body mass index is 29.58 kg/m .  Physical Exam  Vitals signs reviewed.   Constitutional:       Appearance: She is not ill-appearing.   Cardiovascular:      Rate and Rhythm: Normal rate and regular rhythm.   Pulmonary:      Effort: Pulmonary effort is normal.      Breath sounds: Normal breath sounds.   Abdominal:      Tenderness: There is no right CVA tenderness or left CVA tenderness.      Comments: Reported suprapubic discomfort on palpation.            Results for orders placed or performed in visit on 03/03/21 (from the past 24 hour(s))   *UA reflex to Microscopic and Culture ([place] and [facility] (except [place] and [place])    Specimen: Midstream Urine   Result Value Ref Range    Color Urine Yellow     Appearance Urine Clear     Glucose Urine Negative NEG^Negative mg/dL    Bilirubin Urine Negative NEG^Negative    Ketones Urine Negative NEG^Negative mg/dL    Specific Gravity Urine 1.020 1.003 - 1.035    Blood Urine Large (A) NEG^Negative    pH Urine 6.0 5.0 - 7.0 pH    Protein Albumin Urine Negative NEG^Negative mg/dL    Urobilinogen Urine 0.2 0.2 - 1.0 EU/dL    Nitrite Urine Negative NEG^Negative    Leukocyte Esterase Urine Negative NEG^Negative    Source Midstream Urine    Urine Microscopic   Result Value Ref Range    WBC Urine 0 - 5 OTO5^0 - 5 /HPF    RBC Urine 10-25 (A) OTO2^O - 2 /HPF    Squamous Epithelial /LPF Urine Few FEW^Few /LPF    Bacteria Urine Few (A) NEG^Negative /HPF    Mucous Urine Present (A) NEG^Negative /LPF               "

## 2021-03-05 LAB
BACTERIA SPEC CULT: NORMAL
Lab: NORMAL
SPECIMEN SOURCE: NORMAL

## 2021-03-14 ENCOUNTER — HEALTH MAINTENANCE LETTER (OUTPATIENT)
Age: 36
End: 2021-03-14

## 2021-03-28 ENCOUNTER — NURSE TRIAGE (OUTPATIENT)
Dept: NURSING | Facility: CLINIC | Age: 36
End: 2021-03-28

## 2021-03-28 NOTE — TELEPHONE ENCOUNTER
FNA triage call :   Presenting problem :  Pt called.  Currently : 1&0 , eating  And up to bathroom last at 2am for voiding .  Currently T 102.3  Tm , sore throat is 3/10 on painscale now and intermittent cough .   Guideline used : Covid 19 Suspected .   Disposition and recommendations : COVID 19 Nurse Triage Plan/Patient Instructions    Please be aware that novel coronavirus (COVID-19) may be circulating in the community. If you develop symptoms such as fever, cough, or SOB or if you have concerns about the presence of another infection including coronavirus (COVID-19), please contact your health care provider or visit [email or web address].     Disposition/Instructions COVID 19 Nurse Triage Plan/Patient Instructions    Please be aware that novel coronavirus (COVID-19) may be circulating in the community. If you develop symptoms such as fever, cough, or SOB or if you have concerns about the presence of another infection including coronavirus (COVID-19), please contact your health care provider or visit [email or web address].     Disposition/Instructions    Home care recommended. Follow home care protocol based instructions.  Virtual Visit with provider recommended. Reference Visit Selection Guide.    Thank you for taking steps to prevent the spread of this virus.  o Limit your contact with others.  o Wear a simple mask to cover your cough.  o Wash your hands well and often.    Resources    [facility]: About COVID-19: [email or web address]    CDC: What to Do If You're Sick: www.cdc.gov/coronavirus/2019-ncov/about/steps-when-sick.html    CDC: Ending Home Isolation: www.cdc.gov/coronavirus/2019-ncov/hcp/disposition-in-home-patients.html     CDC: Caring for Someone: www.cdc.gov/coronavirus/2019-ncov/if-you-are-sick/care-for-someone.html     [facility]: Interim Guidance for Hospital Discharge to Home: www.health.[facility].mn.us/diseases/coronavirus/hcp/hospdischarge.pdf    [facility]  clinical trials (COVID-19 research studies): [email or web address]/umn-clinical-trials     Below are the COVID-19 hotlines at the [place] Department of Health ([facility]). Interpreters are available.   o For health questions: Call 199-960-0417 or 1-344.843.7710 (7 a.m. to 7 p.m.)  o For questions about schools and childcare: Call 899-410-1712 or 1-433.779.1015 (7 a.m. to 7 p.m.)  Pt became angry and hung up when offered phone provider visit to discuss breastfeeding , covid test and possible strep throat test .   Home care recommended. Follow home care protocol based instructions.  Virtual Visit with provider recommended. Reference Visit Selection Guide.    Thank you for taking steps to prevent the spread of this virus.  o Limit your contact with others.  o Wear a simple mask to cover your cough.  o Wash your hands well and often.    Resources    [facility]: About COVID-19: [email or web address]    CDC: What to Do If You're Sick: www.cdc.gov/coronavirus/2019-ncov/about/steps-when-sick.html    CDC: Ending Home Isolation: www.cdc.gov/coronavirus/2019-ncov/hcp/disposition-in-home-patients.html     CDC: Caring for Someone: www.cdc.gov/coronavirus/2019-ncov/if-you-are-sick/care-for-someone.html     [facility]: Interim Guidance for Hospital Discharge to Home: www.[email or web address].mn.us/diseases/coronavirus/hcp/hospdischarge.pdf    [facility] clinical trials (COVID-19 research studies): [email or web address]-clinical-trials     Below are the COVID-19 hotlines at the [place] Department of Health ([facility]). Interpreters are available.   o For health questions: Call 793-499-0655 or 1-151.514.9251 (7 a.m. to 7 p.m.)  o For questions about schools and childcare: Call 801-286-1238 or 1-350.847.2871 (7 a.m. to 7 p.m.)       Caller verbalizes understanding and denies further questions and will call back if further symptoms to triage or questions  . Rosaline Pang RN  - [place] Nurse Advisor     Reason for  Disposition    [1] COVID-19 infection suspected by caller or triager AND [2] mild symptoms (cough, fever, or others) AND [3] no complications or SOB    Additional Information    Negative: SEVERE difficulty breathing (e.g., struggling for each breath, speaks in single words)    Negative: Difficult to awaken or acting confused (e.g., disoriented, slurred speech)    Negative: Bluish (or gray) lips or face now    Negative: Shock suspected (e.g., cold/pale/clammy skin, too weak to stand, low BP, rapid pulse)    Negative: Sounds like a life-threatening emergency to the triager    Negative: [1] COVID-19 exposure AND [2] no symptoms    Negative: [1] Lives with someone known to have influenza (flu test positive) AND [2] flu-like symptoms (e.g., cough, runny nose, sore throat, SOB; with or without fever)    Negative: [1] Adult with possible COVID-19 symptoms AND [2] triager concerned about severity of symptoms or other causes    Negative: COVID-19 vaccine reaction suspected (e.g., fever, headache, muscle aches) occurring during days 1-3 after getting vaccine    Negative: COVID-19 vaccine, questions about    Negative: SEVERE or constant chest pain or pressure (Exception: mild central chest pain, present only when coughing)    Negative: MODERATE difficulty breathing (e.g., speaks in phrases, SOB even at rest, pulse 100-120)    Negative: [1] Headache AND [2] stiff neck (can't touch chin to chest)    Negative: Chest pain or pressure    Negative: Patient sounds very sick or weak to the triager    Negative: Fever > 103 F (39.4 C)    Negative: [1] Fever > 101 F (38.3 C) AND [2] age > 60    Negative: [1] Fever > 100.0 F (37.8 C) AND [2] bedridden (e.g., nursing home patient, CVA, chronic illness, recovering from surgery)    Negative: [1] HIGH RISK patient (e.g., age > 64 years, diabetes, heart or lung disease, weak immune system) AND [2] new or worsening symptoms    Negative: [1] HIGH RISK patient AND [2] influenza is widespread in  the community AND [3] ONE OR MORE respiratory symptoms: cough, sore throat, runny or stuffy nose    Negative: [1] HIGH RISK patient AND [2] influenza exposure within the last 7 days AND [3] ONE OR MORE respiratory symptoms: cough, sore throat, runny or stuffy nose    Negative: Fever present > 3 days (72 hours)    Negative: [1] Fever returns after gone for over 24 hours AND [2] symptoms worse or not improved    Negative: [1] Continuous (nonstop) coughing interferes with work or school AND [2] no improvement using cough treatment per protocol    Commented on: COVID-19 and breastfeeding, questions about     Breast feeding 16 months old- advised to express for now and discuss with Provider.    Commented on: MILD difficulty breathing (e.g., minimal/no SOB at rest, SOB with walking, pulse <100)     Has not had to  Yet , exert herself .    Protocols used: CORONAVIRUS (COVID-19) DIAGNOSED OR PIMMSYNWK-C-RH 1.3

## 2021-04-12 ENCOUNTER — NURSE TRIAGE (OUTPATIENT)
Dept: NURSING | Facility: CLINIC | Age: 36
End: 2021-04-12

## 2021-04-12 ENCOUNTER — APPOINTMENT (OUTPATIENT)
Dept: CT IMAGING | Facility: CLINIC | Age: 36
End: 2021-04-12
Attending: NURSE PRACTITIONER
Payer: COMMERCIAL

## 2021-04-12 ENCOUNTER — HOSPITAL ENCOUNTER (EMERGENCY)
Facility: CLINIC | Age: 36
Discharge: HOME | End: 2021-04-12
Attending: NURSE PRACTITIONER | Admitting: NURSE PRACTITIONER
Payer: COMMERCIAL

## 2021-04-12 VITALS
BODY MASS INDEX: 28.35 KG/M2 | WEIGHT: 160 LBS | OXYGEN SATURATION: 97 % | RESPIRATION RATE: 16 BRPM | HEART RATE: 83 BPM | SYSTOLIC BLOOD PRESSURE: 108 MMHG | DIASTOLIC BLOOD PRESSURE: 71 MMHG | HEIGHT: 63 IN | TEMPERATURE: 97 F

## 2021-04-12 DIAGNOSIS — U07.1 COVID-19: ICD-10-CM

## 2021-04-12 DIAGNOSIS — R06.02 SHORTNESS OF BREATH: ICD-10-CM

## 2021-04-12 PROBLEM — N20.0 NEPHROLITHIASIS: Status: ACTIVE | Noted: 2021-04-12

## 2021-04-12 PROBLEM — M54.50 LOWER BACK PAIN: Status: ACTIVE | Noted: 2021-04-12

## 2021-04-12 PROBLEM — F41.1 GAD (GENERALIZED ANXIETY DISORDER): Status: ACTIVE | Noted: 2017-01-26

## 2021-04-12 PROBLEM — D72.829 LEUKOCYTOSIS: Status: ACTIVE | Noted: 2021-04-12

## 2021-04-12 PROBLEM — F17.200 NICOTINE DEPENDENCE: Status: ACTIVE | Noted: 2021-04-12

## 2021-04-12 PROBLEM — M79.7 FIBROMYALGIA: Status: ACTIVE | Noted: 2019-05-10

## 2021-04-12 LAB
ALBUMIN SERPL-MCNC: 3.8 G/DL (ref 3.4–5)
ALP SERPL-CCNC: 56 U/L (ref 40–150)
ALT SERPL W P-5'-P-CCNC: 23 U/L (ref 0–50)
ANION GAP SERPL CALCULATED.3IONS-SCNC: 3 MMOL/L (ref 3–14)
AST SERPL W P-5'-P-CCNC: 13 U/L (ref 0–45)
B-HCG FREE SERPL-ACNC: <5 IU/L
BILIRUB SERPL-MCNC: 0.5 MG/DL (ref 0.2–1.3)
BUN SERPL-MCNC: 14 MG/DL (ref 7–30)
CALCIUM SERPL-MCNC: 8.7 MG/DL (ref 8.5–10.1)
CHLORIDE SERPL-SCNC: 108 MMOL/L (ref 94–109)
CO2 SERPL-SCNC: 28 MMOL/L (ref 20–32)
CREAT SERPL-MCNC: 0.72 MG/DL (ref 0.52–1.04)
ERYTHROCYTE [DISTWIDTH] IN BLOOD BY AUTOMATED COUNT: 12.9 % (ref 10–15)
GFR SERPL CREATININE-BSD FRML MDRD: >90 ML/MIN/{1.73_M2}
GLUCOSE SERPL-MCNC: 91 MG/DL (ref 70–99)
HCT VFR BLD AUTO: 43.9 % (ref 35–47)
HGB BLD-MCNC: 14.2 G/DL (ref 11.7–15.7)
INTERPRETATION ECG - MUSE: NORMAL
MCH RBC QN AUTO: 29.3 PG (ref 26.5–33)
MCHC RBC AUTO-ENTMCNC: 32.3 G/DL (ref 31.5–36.5)
MCV RBC AUTO: 91 FL (ref 78–100)
PLATELET # BLD AUTO: 328 10E9/L (ref 150–450)
POTASSIUM SERPL-SCNC: 3.9 MMOL/L (ref 3.4–5.3)
PROT SERPL-MCNC: 7.4 G/DL (ref 6.8–8.8)
RBC # BLD AUTO: 4.84 10E12/L (ref 3.8–5.2)
SODIUM SERPL-SCNC: 139 MMOL/L (ref 133–144)
TROPONIN I SERPL-MCNC: <0.015 UG/L (ref 0–0.04)
WBC # BLD AUTO: 9.6 10E9/L (ref 4–11)

## 2021-04-12 PROCEDURE — 250N000011 HC RX IP 250 OP 636: Performed by: NURSE PRACTITIONER

## 2021-04-12 PROCEDURE — 71275 CT ANGIOGRAPHY CHEST: CPT

## 2021-04-12 PROCEDURE — 99285 EMERGENCY DEPT VISIT HI MDM: CPT | Mod: 25

## 2021-04-12 PROCEDURE — 85027 COMPLETE CBC AUTOMATED: CPT | Performed by: NURSE PRACTITIONER

## 2021-04-12 PROCEDURE — 80053 COMPREHEN METABOLIC PANEL: CPT | Performed by: NURSE PRACTITIONER

## 2021-04-12 PROCEDURE — 250N000009 HC RX 250: Performed by: NURSE PRACTITIONER

## 2021-04-12 PROCEDURE — 93005 ELECTROCARDIOGRAM TRACING: CPT

## 2021-04-12 PROCEDURE — 84702 CHORIONIC GONADOTROPIN TEST: CPT

## 2021-04-12 PROCEDURE — 84484 ASSAY OF TROPONIN QUANT: CPT | Performed by: NURSE PRACTITIONER

## 2021-04-12 RX ORDER — IOPAMIDOL 755 MG/ML
500 INJECTION, SOLUTION INTRAVASCULAR ONCE
Status: COMPLETED | OUTPATIENT
Start: 2021-04-12 | End: 2021-04-12

## 2021-04-12 RX ADMIN — IOPAMIDOL 58 ML: 755 INJECTION, SOLUTION INTRAVENOUS at 12:50

## 2021-04-12 RX ADMIN — SODIUM CHLORIDE 88 ML: 9 INJECTION, SOLUTION INTRAVENOUS at 12:51

## 2021-04-12 ASSESSMENT — ENCOUNTER SYMPTOMS
CHILLS: 0
COUGH: 1
FEVER: 0
ABDOMINAL PAIN: 0
DYSURIA: 0
HEADACHES: 0
FATIGUE: 1
SHORTNESS OF BREATH: 1

## 2021-04-12 ASSESSMENT — MIFFLIN-ST. JEOR: SCORE: 1394.89

## 2021-04-12 NOTE — ED TRIAGE NOTES
Patient diagnosed with COVID 2 weeks ago.  Has worsening SOB and feels like someone is pushing between her shoulder blades.      ABCs intact. Alert and oriented x 4.

## 2021-04-12 NOTE — ED PROVIDER NOTES
"  History   Chief Complaint:  Shortness of Breath and Covid Concern     HPI   Karely Munoz is a 35 year old female with history of fibromyalgia who presents with worsening shortness of breath after being diagnosed with COVID-19 2 weeks ago. She also reports a pain in between her shoulder blades and pleuritic chest pain over her ribs with associated chest pressure. She has a minimal productive cough.    Review of Systems   Constitutional: Positive for fatigue. Negative for chills and fever.   Respiratory: Positive for cough and shortness of breath.    Cardiovascular: Positive for chest pain.   Gastrointestinal: Negative for abdominal pain.   Genitourinary: Negative for dysuria.   Neurological: Negative for headaches.   All other systems reviewed and are negative.      Allergies:  No Known Allergies    Medications:  Trazodone  Ativan  Nortriptyline  Cymbalta     Past Medical History:    Depression   Fibromyalgia  Kidney stone  Positive fetal fibronectin   Leukocytosis  Anxiety      Past Surgical History:    Skin lesion  Ear tubes bilateral  Inguinal hernia repair left      Family History:    Son - Autism     Social History:  Unaccompanied to the ED.     Physical Exam     Patient Vitals for the past 24 hrs:   BP Temp Temp src Pulse Resp SpO2 Height Weight   04/12/21 1245 -- -- -- -- -- 97 % -- --   04/12/21 1230 -- -- -- -- 16 98 % -- --   04/12/21 1215 107/69 -- -- 83 -- -- -- --   04/12/21 1200 101/69 -- -- 86 -- -- -- --   04/12/21 1145 98/73 -- -- 80 20 100 % -- --   04/12/21 1106 114/65 97  F (36.1  C) Temporal 101 20 96 % 1.6 m (5' 3\") 72.6 kg (160 lb)       Physical Exam  General: Alert, No obvious discomfort, well kept  Eyes: PERRL, conjunctivae pink no scleral icterus or conjunctival injection  ENT:   Moist mucus membranes, posterior oropharynx clear without erythema or exudates, No lymphadenopathy, Normal voice  Resp:  Lungs clear to auscultation bilaterally, no crackles/rubs/wheezes. Good air movement  CV: "  Normal rate and rhythm, no murmurs/rubs/gallops  GI:  Abdomen soft and non-distended.  Normoactive BS.  No tenderness, guarding or rebound, No masses  Skin:  Warm, dry.  No rashes or petechiae  Musculoskeletal: No peripheral edema or calf tenderness, Normal gross ROM   Neuro: Alert and oriented to person/place/time, normal sensation  Psychiatric: Normal affect, cooperative, good eye contact      Emergency Department Course   ECG  ECG taken at 1227, ECG read at 1231  Normal sinus rhythm  Normal ECG   No prior for comparison  Rate 69 bpm. OR interval 192 ms. QRS duration 112 ms. QT/QTc 398/426 ms. P-R-T axes 52 74 52.     Imaging:  CT chest PE w contrast:  IMPRESSION: No pulmonary embolism demonstrated.  Report per radiology     Laboratory:  CBC: WBC 9.6, HGB 14.2,    CMP: AWNL (Creatinine 0.72)  ISTAT HCG Quantitative POCT (Collected 1158): <5.0   Troponin (Collected 1145): <0.015    Emergency Department Course:    Reviewed:  1200 I reviewed nursing notes, vitals, past medical history and care everywhere    Assessments:  1205 ZAHIDA Rizzo student, obtained history and examined the patient.   1402 I rechecked the patient and explained findings.    Disposition:  The patient was discharged to home.       Impression & Plan     Medical Decision Making:  Karely Munoz is a 35 year old female who presents today for evaluation of shortness of breath, chest discomfort and a recent diagnosis of COVID-19.  Her main concern with COVID-19 and the new shortness of breath was for possible PE.  Her evaluation today shows laboratory studies that show a negative troponin and are otherwise not acute.  I did obtain a CT scan cannula which shows no evidence of PE.  Her chest discomfort is most likely musculoskeletal in nature given the body aches that are associated with COVID-19.  She does have a mother who also has COVID-19 and is in the hospital this was concerning for her again so she presented today for evaluation.  At this  point time there is no indication for further testing or imagery.  She appears to be safe and appropriate for outpatient management follow-up.  She was advised on COVID-19 precautions self-care and return protocols.  She is discharged home.    Covid-19  Karely Munoz was evaluated during a global COVID-19 pandemic, which necessitated consideration that the patient might be at risk for infection with the SARS-CoV-2 virus that causes COVID-19.   Applicable protocols for evaluation were followed during the patient's care.   COVID-19 was considered as part of the patient's evaluation. The plan for testing is:  a test was obtained at a previous visit and reviewed & considered today.    Diagnosis:    ICD-10-CM    1. COVID-19  U07.1    2. Shortness of breath  R06.02        Discharge Medications:  New Prescriptions    No medications on file       Scribe Disclosure:  Deborah MCCORMICK, am serving as a scribe at 11:26 AM on 4/12/2021 to document services personally performed by Madhav Caban APRN based on my observations and the provider's statements to me.          Madhav Caban APRN CNP  04/12/21 1502       Madhav Caban APRN CNP  04/12/21 1793

## 2021-04-12 NOTE — DISCHARGE INSTRUCTIONS
Discharge Instructions  COVID-19    COVID-19 is the disease caused by a new coronavirus. The virus spreads from person-to-person primarily by droplets when an infected person coughs or sneezes and the droplet either lands on another person or that other person touches a surface with the droplet on it. There are tests available to diagnose COVID-19. There is no specific treatment or medicine for the disease.    You may have been diagnosed with COVID, may be being tested for COVID and have a pending test result, or may have been exposed to COVID.    Symptoms of COVID-19    Many people have no symptoms or mild symptoms.  Symptoms may usually appear 4 to 5 days (up to 14 days) after contact with a person with COVID-19. Some people will get severe symptoms and pneumonia. Usual symptoms are:     ? Fever  ? Cough  ? Trouble breathing    Less common symptoms are: Headache, body aches, sore throat, sneezing, diarrhea, loss of taste or smell.    Isolation and Quarantine    You were seen because you have symptoms, had an exposure, or had some other concern about possible COVID. The best way to stop the spread of the virus is to avoid contact with others.  Isolation refers to sick people staying away from people who are not sick. A person in quarantine is limiting activity because they were exposed and are waiting to see if they might become sick.    If you test positive for COVID, you should stay home (isolation) for at least 10 days after your symptoms began, and for 24 hours with no fever and improvement of symptoms--whichever is longer. (Your fever should be gone for 24 hours without using fever-reducing medicine). If you have no symptoms, you should stay home (isolation) for 10 days from the day of the test.    For example, if you have a fever and cough for 6 days, you need to stay home 4 more days with no fever for a total of 10 days. Or, if you have a fever and cough for 10 days, you need to stay home one more day with  no fever for a total of 11 days.    If you have a high-risk exposure to COVID (you spent 15 minutes or more within six feet of somebody who has COVID), you should stay home (quarantine) for 14 days. Even if you test negative for COVID, the CDC recommends a 14-day quarantine from the time of your last exposure to that individual. There are options for a shortened (<14 day quarantine) you can review at:    https://www.health.[facility].[place]./diseases/coronavirus/close.html#long    If you have symptoms but a negative test, you should stay at home until you are symptom-free and without fever for 24 hours, using the same judgment you would for when it is safe to return to work/school from strep throat, influenza, or the common cold. If you worsen, you should consider being re-evaluated.    If you are being tested for COVID and your test is pending, you should stay home until you know your test result.    How should I protect myself and others?    Do not go to work or school. Have a friend or relative do your shopping. Do not use public transportation (bus, train) or ridesharing (Lyft, Uber).    Separate yourself from other people in your home. As much as possible, you should stay in one room and away from other people in your home. Also, use a separate bathroom, if possible. Avoid handling pets or other animals while sick.     Wear a facemask if you need to be around other people and cover your mouth and nose with a tissue when you cough or sneeze.     Avoid sharing personal household items. You should not share dishes, drinking glasses, forks/knives/spoons, towels, or bedding with other people in your home. After using these items, they should be washed with soap and water. Clean parts of your home that are touched often (doorknobs, faucets, countertops, etc.) daily.     Wash your hands often with soap and water for at least 20 seconds or use an alcohol-based hand  containing at least 60% alcohol.     Avoid touching  your face.    Treat your symptoms. You can take Acetaminophen (Tylenol) to treat body aches and fever as needed for comfort. Ibuprofen (Advil or Motrin) can be used as well if you still have symptoms after taking Tylenol. Drink fluids. Rest.    Watch for worsening symptoms such as shortness of breath/difficulty breathing or very severe weakness.    Employers/workplaces are being asked by the Centers for Disease Control (CDC) to not request notes/documentation for you to return to work or prove that you were ill. You may choose to show your employer this paperwork. Also, repeat testing should not be required to return to work.    Exercise/Sports in rare cases, COVID could affect your heart in a way that makes exercise or participation in sports dangerous.  If you have a mild COVID illness (fever, cough, sore throat, and similar symptoms but no difficulty breathing or abnormalities of the lung): After your COVID symptoms have resolved, wait 14-days before returning to activity.  If you have more than a mild illness (meaning that you have problems with your breathing or lungs) or if you participate in competitive or strenuous activity or have a history of heart disease: Please see your primary doctor/provider prior to return to activity/competition.    Return to the Emergency Department if:    If you are developing worsening breathing, shortness of breath, or feel worse you should seek medical attention.  If you are uncertain, contact your health care provider/clinic. If you need emergency medical attention, call 911 and tell them you have been ill.

## 2021-04-12 NOTE — TELEPHONE ENCOUNTER
Patient calling , c/o  Sob , kayden has had 15 days of covid, and now she has difficulty breathing.  Patient is reporting pain in shoulder blades with dyspnea.    Patient advised to go to the ER @ [facility]. Now.  Peggy Palmer RN on 4/12/2021 at 10:04 AM        COVID 19 Nurse Triage Plan/Patient Instructions    Please be aware that novel coronavirus (COVID-19) may be circulating in the community. If you develop symptoms such as fever, cough, or SOB or if you have concerns about the presence of another infection including coronavirus (COVID-19), please contact your health care provider or visit [email or web address].     Disposition/Instructions    ED Visit recommended. Follow protocol based instructions.     Bring Your Own Device:  Please also bring your smart device(s) (smart phones, tablets, laptops) and their charging cables for your personal use and to communicate with your care team during your visit.    Thank you for taking steps to prevent the spread of this virus.  o Limit your contact with others.  o Wear a simple mask to cover your cough.  o Wash your hands well and often.    Resources    [facility]: About COVID-19: [email or web address]    CDC: What to Do If You're Sick: www.cdc.gov/coronavirus/2019-ncov/about/steps-when-sick.html    CDC: Ending Home Isolation: www.cdc.gov/coronavirus/2019-ncov/hcp/disposition-in-home-patients.html     CDC: Caring for Someone: www.cdc.gov/coronavirus/2019-ncov/if-you-are-sick/care-for-someone.html     [facility]: Interim Guidance for Hospital Discharge to Home: www.health.[facility].[place].us/diseases/coronavirus/hcp/hospdischarge.pdf    [facility] clinical trials (COVID-19 research studies): [email or web address]/umn-clinical-trials     Below are the COVID-19 hotlines at the [place] Department of Health ([facility]). Interpreters are available.   o For health questions: Call 670-553-5682 or 1-348.905.5943 (7 a.m. to 7 p.m.)  o For questions  about schools and childcare: Call 586-757-9380 or 1-960.767.6522 (7 a.m. to 7 p.m.)     Reason for Disposition    Recent illness requiring prolonged bedrest (i.e., immobilization)    MODERATE difficulty breathing (e.g., speaks in phrases, SOB even at rest, pulse 100-120) of new onset or worse than normal    Protocols used: BREATHING DIFFICULTY-A-OH

## 2021-04-16 ENCOUNTER — OFFICE VISIT (OUTPATIENT)
Dept: OBGYN | Facility: CLINIC | Age: 36
End: 2021-04-16
Payer: COMMERCIAL

## 2021-04-16 VITALS — BODY MASS INDEX: 29.58 KG/M2 | WEIGHT: 167 LBS | DIASTOLIC BLOOD PRESSURE: 66 MMHG | SYSTOLIC BLOOD PRESSURE: 102 MMHG

## 2021-04-16 DIAGNOSIS — R35.0 URINARY FREQUENCY: Primary | ICD-10-CM

## 2021-04-16 LAB
ALBUMIN UR-MCNC: NEGATIVE MG/DL
APPEARANCE UR: CLEAR
BACTERIA #/AREA URNS HPF: ABNORMAL /HPF
BILIRUB UR QL STRIP: NEGATIVE
COLOR UR AUTO: YELLOW
GLUCOSE UR STRIP-MCNC: NEGATIVE MG/DL
HGB UR QL STRIP: NEGATIVE
KETONES UR STRIP-MCNC: NEGATIVE MG/DL
LEUKOCYTE ESTERASE UR QL STRIP: ABNORMAL
NITRATE UR QL: NEGATIVE
NON-SQ EPI CELLS #/AREA URNS LPF: ABNORMAL /LPF
PH UR STRIP: 5.5 PH (ref 5–7)
RBC #/AREA URNS AUTO: ABNORMAL /HPF
SOURCE: ABNORMAL
SP GR UR STRIP: 1.02 (ref 1–1.03)
SPECIMEN SOURCE: NORMAL
UROBILINOGEN UR STRIP-ACNC: 0.2 EU/DL (ref 0.2–1)
WBC #/AREA URNS AUTO: ABNORMAL /HPF
WET PREP SPEC: NORMAL

## 2021-04-16 PROCEDURE — 87086 URINE CULTURE/COLONY COUNT: CPT | Performed by: OBSTETRICS & GYNECOLOGY

## 2021-04-16 PROCEDURE — 81001 URINALYSIS AUTO W/SCOPE: CPT | Performed by: OBSTETRICS & GYNECOLOGY

## 2021-04-16 PROCEDURE — 87210 SMEAR WET MOUNT SALINE/INK: CPT | Performed by: OBSTETRICS & GYNECOLOGY

## 2021-04-16 PROCEDURE — 99213 OFFICE O/P EST LOW 20 MIN: CPT | Performed by: OBSTETRICS & GYNECOLOGY

## 2021-04-16 RX ORDER — TOLTERODINE 4 MG/1
4 CAPSULE, EXTENDED RELEASE ORAL DAILY
Qty: 45 CAPSULE | Refills: 0 | Status: SHIPPED | OUTPATIENT
Start: 2021-04-16 | End: 2021-10-18

## 2021-04-16 NOTE — NURSING NOTE
"Chief Complaint   Patient presents with     Urinary Problem       Initial /66   Wt 75.8 kg (167 lb)   LMP 2021   BMI 29.58 kg/m   Estimated body mass index is 29.58 kg/m  as calculated from the following:    Height as of 21: 1.6 m (5' 3\").    Weight as of this encounter: 75.8 kg (167 lb).  BP completed using cuff size: regular    Questioned patient about current smoking habits.  Pt. has never smoked.          The following HM Due: NONE      The following patient reported/Care Every where data was sent to:  P ABSTRACT QUALITY INITIATIVES [[id number]]        Rukhsana Weeks [facility]                 "

## 2021-04-16 NOTE — Clinical Note
Angel Melgar M.D. [address] [address]  [phone] phone  [phone] fax       Dear Dr Jones,         Thank you for the opportunity to see your patient. Please see my office visit notes for your review.  As always, I appreciate the opportunity to participate in your patient's care.     Sincerely yours,    Angel Melgar M.D.

## 2021-04-16 NOTE — PATIENT INSTRUCTIONS
You can reach your [facility] Care Team any time of the day by calling [phone]. This number will put you in touch with the 24 hour nurse line if the clinic is closed.    To contact your OB/GYN Station Coordinator/Surgery Scheduler please call [phone]. This is a direct number for your care team between 8 a.m. and 4 p.m. Monday through Friday.    [facility] is open for your convenience:  Monday through Friday 8 a.m. to 6 p.m.  Closed weekends and all major holidays.

## 2021-04-17 LAB
BACTERIA SPEC CULT: NO GROWTH
SPECIMEN SOURCE: NORMAL

## 2021-04-19 NOTE — PROGRESS NOTES
HPI:  Karely Munoz is a 35 year old white female  Patient's last menstrual period was 2021.  Who had a Nexplanon for contraception had it removed and is not presently requiring anything for contraception, who I am asked to see by Dr. Jones   for evaluation of lower urinary tract symptoms.  The patient states that she has recently had a negative home pregnancy test and serum pregnancy test and her last menses was on time.  The patient said that she has had 4 UTIs since  but is treated for more than that.  Review of urine cultures dating back to  were negative.  The patient states that she can have intermittent urinary frequency up to 5 times daily and the next day will feel normal again she denies that is related to intercourse.  Denies sahara hematuria.  No fevers chills nausea vomiting or changes in bowel symptoms.  We discussed fluid intake and she denies using coffee and rarely drinks tea with rare chocolate.  The patient does smoke a quarter of a pack a day.  She has 2-3 energy drinks daily and rare alcohol intake    Past Medical History:   Diagnosis Date     Depressive disorder     depression/anxiety     Fibromyalgia      Kidney stone      Positive fetal fibronectin at 22 weeks to 34 weeks gestation 10/31/2019     Past Surgical History:   Procedure Laterality Date     BIOPSY OF SKIN LESION  2020     ENT SURGERY Bilateral     Ear tubes     HERNIA REPAIR Left     Inguinal     LEEP TX, CERVICAL  2009    nl since  done in [facility]  path report ?     wisdom teeth       Family History   Problem Relation Age of Onset     Hearing Loss Mother      Breast Cancer Paternal Grandmother      Breast Cancer Other         paternal aunts x's 3     Social History     Socioeconomic History     Marital status:      Spouse name: Not on file     Number of children: Not on file     Years of education: Not on file     Highest education level: Not on file   Occupational History     Not on file    Social Needs     Financial resource strain: Not on file     Food insecurity     Worry: Not on file     Inability: Not on file     Transportation needs     Medical: Not on file     Non-medical: Not on file   Tobacco Use     Smoking status: Current Every Day Smoker     Packs/day: 0.25     Smokeless tobacco: Never Used   Substance and Sexual Activity     Alcohol use: Not Currently     Drug use: No     Sexual activity: Yes     Partners: Male     Birth control/protection: Implant   Lifestyle     Physical activity     Days per week: Not on file     Minutes per session: Not on file     Stress: Not on file   Relationships     Social connections     Talks on phone: Not on file     Gets together: Not on file     Attends [employer] service: Not on file     Active member of club or organization: Not on file     Attends meetings of clubs or organizations: Not on file     Relationship status: Not on file     Intimate partner violence     Fear of current or ex partner: Not on file     Emotionally abused: Not on file     Physically abused: Not on file     Forced sexual activity: Not on file   Other Topics Concern     Parent/sibling w/ CABG, MI or angioplasty before 65F 55M? Not Asked   Social History Narrative     Not on file       Allergies:  Patient has no known allergies.    Current Outpatient Medications   Medication Sig Dispense Refill     LORazepam (ATIVAN) 0.5 MG tablet Take 0.5 mg by mouth every 6 hours as needed       nortriptyline (PAMELOR) 10 MG capsule Take 10 mg by mouth At Bedtime       Prenatal Vit-Fe Fumarate-FA (PRENATAL MULTIVITAMIN W/IRON) 27-0.8 MG tablet Take 1 tablet by mouth daily       tolterodine ER (DETROL LA) 4 MG 24 hr capsule Take 1 capsule (4 mg) by mouth daily 45 capsule 0       Review Of Systems   ROS: 10 point ROS neg other than the symptoms noted above in the HPI.    Exam:  /66   Wt 75.8 kg (167 lb)   LMP 04/03/2021   BMI 29.58 kg/m    {Constitutional: healthy, alert and no  distress  Head: Normocephalic. No masses, lesions, tenderness or abnormalities  Neck: Neck supple. No adenopathy. Thyroid symmetric, normal size,, Carotids without bruits.  ENT: NEGATIVE for ear, mouth and throat problems  Cardiovascular: negative, PMI normal. No lifts, heaves, or thrills. RRR. No murmurs, clicks gallops or rub  Respiratory: negative, Percussion normal. Good diaphragmatic excursion. Lungs clear  Gastrointestinal: Abdomen soft, non-tender. BS normal. No masses, organomegaly  Genitourinary: Normal external genitalia without lesions and the patient voided 50 cc -3 cc postvoid residual.  Specimen be sent for urine analysis and culture.  No leakage of urine with coughing and straining.  30 degrees of UV angle hypermobility.  Speculum multipara appearing cervix no lesions seen.  Bimanual exam the uterus is parous mobile firm adnexa without masses enlargement or tenderness rectovaginal exam normal sphincter tone minimal hemorrhoidal tissue    Assessment/Plan:  (R35.0) Urinary frequency  (primary encounter diagnosis)  Comment: I had a lengthy discussion with the patient regarding urinary frequency and urgency.  We discussed the role of bladder irritants in particular caffeinated products energy drinks such as a propel alcohol carbonation and nicotine.  I gave her a detailed written outline of the discussion.  Plan: Urine Culture Aerobic Bacterial, UA reflex to         Microscopic, Urine Microscopic, Wet prep,         tolterodine ER (DETROL LA) 4 MG 24 hr capsule     I did a wet prep today which was negative and a urinalysis which was negative urine culture also returned is negative.  At this point we discussed trying to eliminate bladder irritants including methylxanthine derivatives carbonation and alcohol with fluid management, timed voidings every 2 hours while awake as well as a trial of an antimuscarinic.  Of asked the patient to begin the above therapies do a voiding diary for 2 days, begin Detrol LA  4 mg daily and then in 1 month repeat the voiding diary for 2 days.  I like to see her back in 1 month to reassess.  I gave her a detailed written outline of our discussion.  Contraceptive counseling was also undertaken      Angel Melgar M.D.

## 2021-05-11 ENCOUNTER — TELEPHONE (OUTPATIENT)
Dept: FAMILY MEDICINE | Facility: CLINIC | Age: 36
End: 2021-05-11

## 2021-05-11 NOTE — TELEPHONE ENCOUNTER
Patient reports doing well, notes pneumonia is in household as 3 of her kids are sick with it. Denies ER follow up at this time. Engaged in AIDET language throughout phone call, advised patient to please call back if she would want an appointment.

## 2021-05-13 ENCOUNTER — OFFICE VISIT (OUTPATIENT)
Dept: URGENT CARE | Facility: URGENT CARE | Age: 36
End: 2021-05-13
Payer: COMMERCIAL

## 2021-05-13 VITALS
TEMPERATURE: 98.3 F | HEART RATE: 86 BPM | WEIGHT: 167 LBS | DIASTOLIC BLOOD PRESSURE: 54 MMHG | OXYGEN SATURATION: 98 % | RESPIRATION RATE: 16 BRPM | SYSTOLIC BLOOD PRESSURE: 100 MMHG | BODY MASS INDEX: 29.58 KG/M2

## 2021-05-13 DIAGNOSIS — Z20.818 STREP THROAT EXPOSURE: Primary | ICD-10-CM

## 2021-05-13 LAB
DEPRECATED S PYO AG THROAT QL EIA: NEGATIVE
SPECIMEN SOURCE: NORMAL
SPECIMEN SOURCE: NORMAL
STREP GROUP A PCR: NOT DETECTED

## 2021-05-13 PROCEDURE — 99N1174 PR STATISTIC STREP A RAPID: Performed by: FAMILY MEDICINE

## 2021-05-13 PROCEDURE — 99213 OFFICE O/P EST LOW 20 MIN: CPT | Performed by: FAMILY MEDICINE

## 2021-05-13 PROCEDURE — 87651 STREP A DNA AMP PROBE: CPT | Performed by: FAMILY MEDICINE

## 2021-05-13 NOTE — PROGRESS NOTES
ASSESSMENT/  PLAN:  Strep throat exposure     - Streptococcus A Rapid Scr w Reflx to PCR  - Group A Streptococcus PCR Throat Swab      discussed with the patient that a confirmatory strep culture will be performed and that she will be called if the culture is positive for strep.  We discussed other possible causes of pharyngitis including cold viruses ,  Seasonal allergies     Symptomatic treat with gargles, lozenges, and OTC analgesic as needed. Follow-up with primary clinic if not improving.    --------------------------------------------------------------------------------------------------------------------------------    SUBJECTIVE:  Chief Complaint   Patient presents with     Pharyngitis     Karely Munoz is a 35 year old female   with a chief complaint of sore throat.  Onset of symptoms was 1 day(s) ago.    Course of illness: sudden onset, still present and constant.  Severity moderate  Current and Associated symptoms: stuffy nose and sore throat  Treatment measures tried include Tylenol/Ibuprofen.  Predisposing factors include exposure to strep- daughter positive yesterday and seasonal allergies.    Past Medical History:   Diagnosis Date     Depressive disorder     depression/anxiety     Fibromyalgia      Kidney stone      Positive fetal fibronectin at 22 weeks to 34 weeks gestation 10/31/2019     Patient Active Problem List   Diagnosis     Family history of Downs syndrome     Nicotine dependence     Nephrolithiasis     Leukocytosis     Lower back pain     MARISA (generalized anxiety disorder)     Fibromyalgia         ALLERGIES:  Patient has no known allergies.    LORazepam (ATIVAN) 0.5 MG tablet, Take 0.5 mg by mouth every 6 hours as needed  nortriptyline (PAMELOR) 10 MG capsule, Take 10 mg by mouth At Bedtime  Prenatal Vit-Fe Fumarate-FA (PRENATAL MULTIVITAMIN W/IRON) 27-0.8 MG tablet, Take 1 tablet by mouth daily  tolterodine ER (DETROL LA) 4 MG 24 hr capsule, Take 1 capsule (4 mg) by mouth daily    No  current facility-administered medications on file prior to visit.       Social History     Tobacco Use     Smoking status: Current Every Day Smoker     Packs/day: 0.25     Smokeless tobacco: Never Used   Substance Use Topics     Alcohol use: Not Currently       Family History   Problem Relation Age of Onset     Hearing Loss Mother      Breast Cancer Paternal Grandmother      Breast Cancer Other         paternal aunts x's 3         ROS:  CONSTITUTIONAL:NEGATIVE for fever, chills,   INTEGUMENTARY/SKIN: NEGATIVE for worrisome rashes  or lesions  EYES: NEGATIVE for vision changes or irritation  RESP:NEGATIVE for significant cough or SOB  GI: NEGATIVE for nausea, abdominal pain,  or change in bowel habits    OBJECTIVE:   /54 (BP Location: Right arm, Patient Position: Chair, Cuff Size: Adult Large)   Pulse 86   Temp 98.3  F (36.8  C) (Oral)   Resp 16   Wt 75.8 kg (167 lb)   SpO2 98%   Breastfeeding No   BMI 29.58 kg/m    GENERAL APPEARANCE: healthy, alert and mild distress  EYES: EOMI,  PERRL, conjunctiva clear  HENT: ear canals and TM's normal.  Nose normal.  Pharynx erythematous with some exudate noted.  NECK: supple, non-tender to palpation, no adenopathy noted  RESP: lungs clear to auscultation - no rales, rhonchi or wheezes  CV: regular rates and rhythm, normal S1 S2, no murmur noted  ABDOMEN:  soft, nontender, no HSM or masses and bowel sounds normal  SKIN: no suspicious lesions or rashes    Rapid Strep test is negative

## 2021-10-04 ENCOUNTER — TELEPHONE (OUTPATIENT)
Dept: FAMILY MEDICINE | Facility: CLINIC | Age: 36
End: 2021-10-04

## 2021-10-15 NOTE — PROGRESS NOTES
"Pre-Visit Planning     Appointment Notes for this encounter:   Reviewed - zg. Neck and back pain, tingling and numbness in arms    Questionnaires Reviewed/Assigned  No additional questionnaires are needed    Patient preferred phone number: [phone]      Spoke to patient via phone. Patient does not have additional questions or concerns.        Visit is not preventive.    Patient is established.    Patient reminded of date and time. Barriers addressed: yes    Health Maintenance Due   Topic Date Due     ADVANCE CARE PLANNING  Never done     Pneumococcal Vaccine: Pediatrics (0 to 5 Years) and At-Risk Patients (6 to 64 Years) (1 of 2 - PPSV23) Never done     COVID-19 Vaccine (1) Never done     HEPATITIS C SCREENING  Never done     PREVENTIVE CARE VISIT  01/06/2021     INFLUENZA VACCINE (1) Never done     Patient is due for:  preventive care visit  No appointment needed.    [employer]  Patient is active on [employer].    Questionnaire Review   Advised patient to arrive early in order to complete questionnaires.    Call Summary  \"Thank you for your time today.  If anything comes up before your appointment, please feel free to contact us at [phone].\"    "

## 2021-10-18 ENCOUNTER — ANCILLARY PROCEDURE (OUTPATIENT)
Dept: GENERAL RADIOLOGY | Facility: CLINIC | Age: 36
End: 2021-10-18
Attending: FAMILY MEDICINE
Payer: COMMERCIAL

## 2021-10-18 ENCOUNTER — OFFICE VISIT (OUTPATIENT)
Dept: FAMILY MEDICINE | Facility: CLINIC | Age: 36
End: 2021-10-18
Payer: COMMERCIAL

## 2021-10-18 VITALS
DIASTOLIC BLOOD PRESSURE: 60 MMHG | HEART RATE: 88 BPM | BODY MASS INDEX: 29.37 KG/M2 | SYSTOLIC BLOOD PRESSURE: 100 MMHG | RESPIRATION RATE: 18 BRPM | TEMPERATURE: 99 F | WEIGHT: 165.8 LBS

## 2021-10-18 DIAGNOSIS — M54.2 CHRONIC NECK AND BACK PAIN: ICD-10-CM

## 2021-10-18 DIAGNOSIS — G89.29 CHRONIC NECK AND BACK PAIN: ICD-10-CM

## 2021-10-18 DIAGNOSIS — M54.2 CHRONIC NECK AND BACK PAIN: Primary | ICD-10-CM

## 2021-10-18 DIAGNOSIS — G89.29 CHRONIC NECK AND BACK PAIN: Primary | ICD-10-CM

## 2021-10-18 DIAGNOSIS — M54.9 CHRONIC NECK AND BACK PAIN: ICD-10-CM

## 2021-10-18 DIAGNOSIS — M54.9 CHRONIC NECK AND BACK PAIN: Primary | ICD-10-CM

## 2021-10-18 PROBLEM — J45.909 ASTHMA: Status: ACTIVE | Noted: 2021-10-18

## 2021-10-18 PROCEDURE — 72040 X-RAY EXAM NECK SPINE 2-3 VW: CPT | Performed by: RADIOLOGY

## 2021-10-18 PROCEDURE — 72100 X-RAY EXAM L-S SPINE 2/3 VWS: CPT | Performed by: RADIOLOGY

## 2021-10-18 PROCEDURE — 99214 OFFICE O/P EST MOD 30 MIN: CPT | Performed by: FAMILY MEDICINE

## 2021-10-18 RX ORDER — DULOXETIN HYDROCHLORIDE 60 MG/1
CAPSULE, DELAYED RELEASE ORAL
COMMUNITY
Start: 2021-10-15 | End: 2022-06-08

## 2021-10-18 RX ORDER — CYCLOBENZAPRINE HCL 5 MG
5 TABLET ORAL 3 TIMES DAILY PRN
Qty: 30 TABLET | Refills: 0 | Status: SHIPPED | OUTPATIENT
Start: 2021-10-18 | End: 2022-05-05

## 2021-10-18 ASSESSMENT — ENCOUNTER SYMPTOMS
BACK PAIN: 1
FEVER: 0
WEAKNESS: 0
NUMBNESS: 0

## 2021-10-18 NOTE — PATIENT INSTRUCTIONS
Patient Education     Relieving Back Pain  Back pain is a common problem. You can strain back muscles by lifting too much weight or just by moving the wrong way. Back strain can be uncomfortable, even painful. And it can take weeks or months to improve. To help yourself feel better and prevent future back strains, try these tips.  Important: Don't give aspirin to children or teens without first discussing it with your child's healthcare provider.  Ice    Ice reduces muscle pain and swelling. It helps most during the first 24 to 48 hours after an injury.    Wrap an ice pack or a bag of frozen peas in a thin towel. Never put ice directly on your skin.    Place the ice where your back hurts the most.    Don t ice for more than 20 minutes at a time.    You can use ice several times a day.  Medicines  Over-the-counter pain relievers include acetaminophen and anti-inflammatory medicines, which includes aspirin, naproxen, or ibuprofen. They can help ease discomfort. Some also reduce swelling.    Tell your healthcare provider about any medicines you are already taking.    Take medicines only as directed.  Manipulation and massage  Having manipulation by an osteopathic doctor or chiropractor may be helpful. Getting a massage also may help.   Heat  After the first 48 hours, heat can relax sore muscles and improve blood flow.    Try a warm bath or shower. Or use a heating pad set on low. To prevent a burn, keep a cloth between you and the heating pad.    Don t use a heating pad for more than 15 minutes at a time. Never sleep on a heating pad.  [employer] last reviewed this educational content on 6/1/2018 2000-2021 [employer]. All rights reserved. This information is not intended as a substitute for professional medical care. Always follow your healthcare professional's instructions.

## 2021-10-18 NOTE — PROGRESS NOTES
Assessment & Plan     Chronic neck and back pain  Patient has longstanding history of fibromyalgia diagnosed at an outside facility, her exam findings are suggestive of muscular pain. Given the chronicity of her pain, x-rays performed without any findings to suggest fractures, dislocations.  She has no findings of cervical radiculopathy, or lumbar stenosis.  I think she would benefit from starting Flexeril, heating pads, for severe breakthrough pain, she can take Tylenol 3.  If this therapy works well for her, I recommend we update her controlled substance agreement and get a urine drug screen.  Otherwise could also think of trigger point injections as an adjunct therapy.  - XR Lumbar Spine 2/3 Views  - XR Cervical Spine 2/3 Views  - cyclobenzaprine (FLEXERIL) 5 MG tablet  Dispense: 30 tablet; Refill: 0  - acetaminophen-codeine (TYLENOL #3) 300-30 MG tablet  Dispense: 24 tablet; Refill: 0    Return in about 3 days (around 10/21/2021) for If symptoms do not improve or gets worse..    Eric Jones MD  [facility]    Felice Cali is a 35 year old who presents for the following health issues     Back Pain   Pertinent negatives include no fever, no numbness and no weakness.   History of Present Illness       Back Pain:  She presents for follow up of back pain. Patient's back pain is a chronic problem.  Location of back pain:  Right lower back, left lower back, right middle of back, left middle of back, right upper back, left upper back, right side of neck, left side of neck, left shoulder and right hip  Description of back pain: cramping and shooting  Back pain spreads: left shoulder and left side of neck    Since patient first noticed back pain, pain is: gradually worsening  Does back pain interfere with her job:  No      She eats 2-3 servings of fruits and vegetables daily.She consumes 0 sweetened beverage(s) daily.She exercises with enough effort to increase her heart rate 30 to 60  "minutes per day.  She exercises with enough effort to increase her heart rate 4 days per week.   She is taking medications regularly.     No saddle anesthesia.  Occasional radiation to both of her feet.  No incontinence difficulty of bowel or bladder functions.  No falls.  Pain not modified by position or leaning on objects.    Review of Systems   Constitutional: Negative for fever.   Musculoskeletal: Positive for back pain.   Neurological: Negative for weakness and numbness.          Objective    /60 (BP Location: Right arm, Patient Position: Sitting, Cuff Size: Adult Regular)   Pulse 88   Temp 99  F (37.2  C) (Oral)   Resp 18   Wt 75.2 kg (165 lb 12.8 oz)   BMI 29.37 kg/m    Body mass index is 29.37 kg/m .  Physical Exam  Cardiovascular:      Rate and Rhythm: Normal rate and regular rhythm.   Pulmonary:      Effort: Pulmonary effort is normal.      Breath sounds: Normal breath sounds.   Musculoskeletal:         General: Normal range of motion.        Arms:       Cervical back: Normal range of motion.      Comments: Tenderness over the depicted area.  No spinous process tenderness      Negative forward bend test, negative straight leg raise test   Neurological:      General: No focal deficit present.      Comments: Negative Lhermitte's            XR: \"FINDINGS:   CERVICAL SPINE: Normal vertebral body heights. Slightly reversed  lordosis. Mild leftward cervicothoracic curvature. Normal interbody  spaces and facets. Normal lung apices.     LUMBAR SPINE: 5 lumbar type vertebral bodies. Mild leftward  thoracolumbar curvature. Exaggerated lordosis. Normal interbody spaces  and facets. Normal bony pelvis.     MILES HOWARD MD \"    "

## 2021-10-24 ENCOUNTER — HEALTH MAINTENANCE LETTER (OUTPATIENT)
Age: 36
End: 2021-10-24

## 2022-04-10 ENCOUNTER — HEALTH MAINTENANCE LETTER (OUTPATIENT)
Age: 37
End: 2022-04-10

## 2022-05-05 ENCOUNTER — OFFICE VISIT (OUTPATIENT)
Dept: URGENT CARE | Facility: URGENT CARE | Age: 37
End: 2022-05-05
Payer: COMMERCIAL

## 2022-05-05 ENCOUNTER — HOSPITAL ENCOUNTER (OUTPATIENT)
Dept: CT IMAGING | Facility: CLINIC | Age: 37
Discharge: HOME | End: 2022-05-05
Attending: INTERNAL MEDICINE | Admitting: INTERNAL MEDICINE
Payer: COMMERCIAL

## 2022-05-05 ENCOUNTER — OFFICE VISIT (OUTPATIENT)
Dept: PEDIATRICS | Facility: CLINIC | Age: 37
End: 2022-05-05
Payer: COMMERCIAL

## 2022-05-05 VITALS
WEIGHT: 175 LBS | SYSTOLIC BLOOD PRESSURE: 103 MMHG | RESPIRATION RATE: 18 BRPM | HEART RATE: 75 BPM | OXYGEN SATURATION: 99 % | BODY MASS INDEX: 31 KG/M2 | DIASTOLIC BLOOD PRESSURE: 69 MMHG | TEMPERATURE: 98.4 F

## 2022-05-05 VITALS
WEIGHT: 165 LBS | DIASTOLIC BLOOD PRESSURE: 64 MMHG | BODY MASS INDEX: 29.23 KG/M2 | OXYGEN SATURATION: 99 % | SYSTOLIC BLOOD PRESSURE: 116 MMHG | RESPIRATION RATE: 18 BRPM | HEART RATE: 77 BPM | TEMPERATURE: 98.3 F

## 2022-05-05 DIAGNOSIS — R10.9 BILATERAL FLANK PAIN: ICD-10-CM

## 2022-05-05 DIAGNOSIS — R10.9 FLANK PAIN: ICD-10-CM

## 2022-05-05 DIAGNOSIS — R30.0 DYSURIA: Primary | ICD-10-CM

## 2022-05-05 DIAGNOSIS — N20.0 NEPHROLITHIASIS: ICD-10-CM

## 2022-05-05 DIAGNOSIS — R82.71 BACTERIA IN URINE: ICD-10-CM

## 2022-05-05 DIAGNOSIS — R10.9 FLANK PAIN: Primary | ICD-10-CM

## 2022-05-05 LAB
ALBUMIN UR-MCNC: NEGATIVE MG/DL
ANION GAP SERPL CALCULATED.3IONS-SCNC: 5 MMOL/L (ref 3–14)
APPEARANCE UR: CLEAR
BACTERIA #/AREA URNS HPF: ABNORMAL /HPF
BASOPHILS # BLD AUTO: 0.1 10E3/UL (ref 0–0.2)
BASOPHILS NFR BLD AUTO: 1 %
BILIRUB UR QL STRIP: NEGATIVE
BUN SERPL-MCNC: 16 MG/DL (ref 7–30)
CALCIUM SERPL-MCNC: 8.7 MG/DL (ref 8.5–10.1)
CHLORIDE BLD-SCNC: 110 MMOL/L (ref 94–109)
CO2 SERPL-SCNC: 24 MMOL/L (ref 20–32)
COLOR UR AUTO: YELLOW
CREAT SERPL-MCNC: 0.53 MG/DL (ref 0.52–1.04)
EOSINOPHIL # BLD AUTO: 0.1 10E3/UL (ref 0–0.7)
EOSINOPHIL NFR BLD AUTO: 1 %
ERYTHROCYTE [DISTWIDTH] IN BLOOD BY AUTOMATED COUNT: 13.2 % (ref 10–15)
GFR SERPL CREATININE-BSD FRML MDRD: >90 ML/MIN/1.73M2
GLUCOSE BLD-MCNC: 100 MG/DL (ref 70–99)
GLUCOSE UR STRIP-MCNC: NEGATIVE MG/DL
HCT VFR BLD AUTO: 43.1 % (ref 35–47)
HGB BLD-MCNC: 14.2 G/DL (ref 11.7–15.7)
HGB UR QL STRIP: NEGATIVE
IMM GRANULOCYTES # BLD: 0 10E3/UL
IMM GRANULOCYTES NFR BLD: 0 %
KETONES UR STRIP-MCNC: NEGATIVE MG/DL
LEUKOCYTE ESTERASE UR QL STRIP: NEGATIVE
LYMPHOCYTES # BLD AUTO: 3.4 10E3/UL (ref 0.8–5.3)
LYMPHOCYTES NFR BLD AUTO: 35 %
MCH RBC QN AUTO: 29.9 PG (ref 26.5–33)
MCHC RBC AUTO-ENTMCNC: 32.9 G/DL (ref 31.5–36.5)
MCV RBC AUTO: 91 FL (ref 78–100)
MONOCYTES # BLD AUTO: 0.5 10E3/UL (ref 0–1.3)
MONOCYTES NFR BLD AUTO: 5 %
MUCOUS THREADS #/AREA URNS LPF: PRESENT /LPF
NEUTROPHILS # BLD AUTO: 5.5 10E3/UL (ref 1.6–8.3)
NEUTROPHILS NFR BLD AUTO: 58 %
NITRATE UR QL: NEGATIVE
NRBC # BLD AUTO: 0 10E3/UL
NRBC BLD AUTO-RTO: 0 /100
PH UR STRIP: 5.5 [PH] (ref 5–7)
PLATELET # BLD AUTO: 330 10E3/UL (ref 150–450)
POTASSIUM BLD-SCNC: 3.8 MMOL/L (ref 3.4–5.3)
RBC # BLD AUTO: 4.75 10E6/UL (ref 3.8–5.2)
RBC #/AREA URNS AUTO: ABNORMAL /HPF
SODIUM SERPL-SCNC: 139 MMOL/L (ref 133–144)
SP GR UR STRIP: 1.02 (ref 1–1.03)
SQUAMOUS #/AREA URNS AUTO: ABNORMAL /LPF
UROBILINOGEN UR STRIP-ACNC: 0.2 E.U./DL
WBC # BLD AUTO: 9.5 10E3/UL (ref 4–11)
WBC #/AREA URNS AUTO: ABNORMAL /HPF

## 2022-05-05 PROCEDURE — 99207 PR FIRST ORDER ACUTE REFERRAL: CPT | Performed by: FAMILY MEDICINE

## 2022-05-05 PROCEDURE — 85025 COMPLETE CBC W/AUTO DIFF WBC: CPT | Performed by: INTERNAL MEDICINE

## 2022-05-05 PROCEDURE — 96360 HYDRATION IV INFUSION INIT: CPT | Mod: 59 | Performed by: INTERNAL MEDICINE

## 2022-05-05 PROCEDURE — 99214 OFFICE O/P EST MOD 30 MIN: CPT | Mod: 25 | Performed by: INTERNAL MEDICINE

## 2022-05-05 PROCEDURE — 96361 HYDRATE IV INFUSION ADD-ON: CPT | Performed by: INTERNAL MEDICINE

## 2022-05-05 PROCEDURE — 74176 CT ABD & PELVIS W/O CONTRAST: CPT

## 2022-05-05 PROCEDURE — 87086 URINE CULTURE/COLONY COUNT: CPT | Performed by: INTERNAL MEDICINE

## 2022-05-05 PROCEDURE — 96374 THER/PROPH/DIAG INJ IV PUSH: CPT | Performed by: INTERNAL MEDICINE

## 2022-05-05 PROCEDURE — 81001 URINALYSIS AUTO W/SCOPE: CPT | Performed by: INTERNAL MEDICINE

## 2022-05-05 PROCEDURE — 36415 COLL VENOUS BLD VENIPUNCTURE: CPT | Performed by: INTERNAL MEDICINE

## 2022-05-05 PROCEDURE — 80048 BASIC METABOLIC PNL TOTAL CA: CPT | Performed by: INTERNAL MEDICINE

## 2022-05-05 RX ORDER — FLUCONAZOLE 150 MG/1
150 TABLET ORAL ONCE
Qty: 1 TABLET | Refills: 0 | Status: SHIPPED | OUTPATIENT
Start: 2022-05-05 | End: 2022-05-05

## 2022-05-05 RX ORDER — ONDANSETRON 2 MG/ML
4 INJECTION INTRAMUSCULAR; INTRAVENOUS EVERY 6 HOURS PRN
Status: DISCONTINUED | OUTPATIENT
Start: 2022-05-05 | End: 2022-06-08

## 2022-05-05 RX ORDER — CIPROFLOXACIN 500 MG/1
500 TABLET, FILM COATED ORAL 2 TIMES DAILY
Qty: 14 TABLET | Refills: 0 | Status: SHIPPED | OUTPATIENT
Start: 2022-05-05 | End: 2022-06-08

## 2022-05-05 RX ORDER — KETOROLAC TROMETHAMINE 30 MG/ML
30 INJECTION, SOLUTION INTRAMUSCULAR; INTRAVENOUS EVERY 6 HOURS PRN
Status: ACTIVE | OUTPATIENT
Start: 2022-05-05 | End: 2022-05-10

## 2022-05-05 RX ADMIN — Medication 1000 ML: at 13:17

## 2022-05-05 RX ADMIN — KETOROLAC TROMETHAMINE 30 MG: 30 INJECTION, SOLUTION INTRAMUSCULAR; INTRAVENOUS at 12:44

## 2022-05-05 NOTE — PROGRESS NOTES
"ASSESSMENT:      Bilateral flank pain with nausea.   Cause not entirely clear.   No suggestion of hydronephrosis, etc.   No suggestion of pyelo by CT.   Some bacteria seen in urine, without WBCs.   Symptoms do not suggest patient obstipated.  CT otherwise unremarkable, labs noncontributory.   I have discussed the option if emperic antibiotics versus watchful management with patient.   I think an argument could be made for both approaches.  However, with patient history of recurrent UTI (years past) when kidney stones were present, I favor emperic treatment with antibiotics for patient.   She is in agreement.        PLAN:  1.  Cipro for 7 days.  Risks and benefits discussed, including tendon issues.  Prescription for diflucan in case yeast infection happens.   Patient declines zofran.  2.  Notify MD for worsening symptoms   3.  Await urine culture      Subjective   Karely is a 36 year old who presents for the following health issues     HPI     Abdominal/Flank Pain  Onset/Duration: X 3 weeks, worsening X 3 days  Description:   Character: Dull ache  Location: right flank > left flank  Radiation: None, hasn't moved  Intensity: 3/10, initially intermittent  Progression of Symptoms:  worsening and intermittent  Accompanying Signs & Symptoms:  Fever/Chills: YES- chills, Tmax normal  Gas/Bloating: YES  Nausea: YES, appetite decreased  Vomitting: no   Diarrhea: no-but more loose then usual  Constipation: no   Dysuria or Hematuria: no   History:   Trauma: no   Previous similar pain: YES- Kidney stones in 2009, kidney infections previously as well  Previous tests done: YES- UA done is UC today  Previous Abdominal Surgery: YES- Groin hernia repair   Precipitating factors:   Does the pain change with:     Food: YES- \"full feeling\"  This is followed by gas and bloating    Bowel Movement: no     Urination: YES- after first morning void, has to urinate again about 10 minutes later   Other factors:  no   Therapies tried and " outcome: Tried Azo first week of symptoms, this didn't help.  No other treatments.      History of stones, few in the past.   Had EWSL ~ 2010 in [place].  Did have stone identified, does not recall type.   Never started on medication for stones.  No recent problems until now    Review of Systems   otherwise negative       Objective    /69 (BP Location: Right arm, Patient Position: Chair, Cuff Size: Adult Large)   Pulse 75   Temp 98.4  F (36.9  C) (Oral)   Resp 18   Wt 79.4 kg (175 lb)   LMP 04/15/2022 (Approximate)   SpO2 99%   BMI 31.00 kg/m    There is no height or weight on file to calculate BMI.  Physical Exam   GENERAL: healthy, alert and no distress  NECK: no adenopathy, no asymmetry, masses, or scars and thyroid normal to palpation  RESP: lungs clear to auscultation - no rales, rhonchi or wheezes  CV: regular rate and rhythm, normal S1 S2, no S3 or S4, no murmur, click or rub, no peripheral edema and peripheral pulses strong  ABDOMEN: soft, no hepatosplenomegaly, no masses and bowel sounds normal  Moderate bilateral CVA tenderness.   Very minimal tenderness over bladder, and LUQ with deeper palpation.  MS: no gross musculoskeletal defects noted, no edema.   Low back nontender.    Results for orders placed or performed in visit on 05/05/22 (from the past 24 hour(s))   Urine Microscopic   Result Value Ref Range    Bacteria Urine Moderate (A) None Seen /HPF    RBC Urine None Seen 0-2 /HPF /HPF    WBC Urine None Seen 0-5 /HPF /HPF    Squamous Epithelials Urine Few (A) None Seen /LPF    Mucus Urine Present (A) None Seen /LPF    Narrative    Urine Culture not indicated   CBC with platelets differential    Narrative    The following orders were created for panel order CBC with platelets differential.  Procedure                               Abnormality         Status                     ---------                               -----------         ------                     CBC with platelets and  reggie.[[id number]]                      Final result                 Please view results for these tests on the individual orders.   Basic metabolic panel   Result Value Ref Range    Sodium 139 133 - 144 mmol/L    Potassium 3.8 3.4 - 5.3 mmol/L    Chloride 110 (H) 94 - 109 mmol/L    Carbon Dioxide (CO2) 24 20 - 32 mmol/L    Anion Gap 5 3 - 14 mmol/L    Urea Nitrogen 16 7 - 30 mg/dL    Creatinine 0.53 0.52 - 1.04 mg/dL    Calcium 8.7 8.5 - 10.1 mg/dL    Glucose 100 (H) 70 - 99 mg/dL    GFR Estimate >90 >60 mL/min/1.73m2   CBC with platelets and differential   Result Value Ref Range    WBC Count 9.5 4.0 - 11.0 10e3/uL    RBC Count 4.75 3.80 - 5.20 10e6/uL    Hemoglobin 14.2 11.7 - 15.7 g/dL    Hematocrit 43.1 35.0 - 47.0 %    MCV 91 78 - 100 fL    MCH 29.9 26.5 - 33.0 pg    MCHC 32.9 31.5 - 36.5 g/dL    RDW 13.2 10.0 - 15.0 %    Platelet Count 330 150 - 450 10e3/uL    % Neutrophils 58 %    % Lymphocytes 35 %    % Monocytes 5 %    % Eosinophils 1 %    % Basophils 1 %    % Immature Granulocytes 0 %    NRBCs per 100 WBC 0 <1 /100    Absolute Neutrophils 5.5 1.6 - 8.3 10e3/uL    Absolute Lymphocytes 3.4 0.8 - 5.3 10e3/uL    Absolute Monocytes 0.5 0.0 - 1.3 10e3/uL    Absolute Eosinophils 0.1 0.0 - 0.7 10e3/uL    Absolute Basophils 0.1 0.0 - 0.2 10e3/uL    Absolute Immature Granulocytes 0.0 <=0.4 10e3/uL    Absolute NRBCs 0.0 10e3/uL     CT IMPRESSION:   1.  Nonobstructing 2 mm calculus in the right kidney.  2.  Trace pelvic free fluid likely physiologic.

## 2022-05-05 NOTE — PATIENT INSTRUCTIONS
PLAN:  1.  Cipro for 7 days.   Prescription for diflucan in case yeast infection happens.  2.  Notify MD for worsening symptoms   3.  Await urine culture

## 2022-05-05 NOTE — PROGRESS NOTES
ASSESSMENT/  PLAN:   Dysuria     - UA macro with reflex to Microscopic and Culture - Clinc Collect    Bilateral flank pain     She has a history of kidney stone about 8 years ago-  [place], with no recurrance of her kidney stone    With her current 3 weeks of flank pain with dysuria, but normal urine-  She would like imaging to evaluate if she has stones in her kidneys that are causing her symptoms    She will go to [facility] for imaging-  Jeffrey Cordoba will manage her evaluation     ------------------------------------------------------------------------------------------------------      SUBJECTIVE:  Chief Complaint   Patient presents with     Flank Pain     Kidney pain on  both sides x 3 weeks, on and off -- pain when she urinates in her kidney, frequency  - was taking Azos PRN  Nausea x 2-3 days        Karely Munoz is a 36 year old female who  presents today  with  bilateral side back pain.   Symptoms of dysuria and back pain have been going on for 3week(s).    Hematuria no. gradual onset, still present and constantand moderate.    Has  no symptoms of fever and chills.   no nausea or vomiting.   This patient does   have a history of kidney stone.  8 years ago   Patient denies long duration, rigors, temperature > 101 degrees F. and Vomiting, significant nausea or diarrhea     Past Medical History:   Diagnosis Date     Depressive disorder     depression/anxiety     Fibromyalgia      Kidney stone      Positive fetal fibronectin at 22 weeks to 34 weeks gestation 10/31/2019     Patient Active Problem List   Diagnosis     Family history of Downs syndrome     Nicotine dependence     Nephrolithiasis     Leukocytosis     Lower back pain     MARISA (generalized anxiety disorder)     Fibromyalgia     Asthma       ALLERGIES:  Patient has no known allergies.    MEDs  DULoxetine (CYMBALTA) 60 MG capsule,   LORazepam (ATIVAN) 0.5 MG tablet, Take 0.5 mg by mouth every 6 hours as needed  cyclobenzaprine  (FLEXERIL) 5 MG tablet, Take 1 tablet (5 mg) by mouth 3 times daily as needed for muscle spasms (Patient not taking: Reported on 5/5/2022)    No current facility-administered medications on file prior to visit.      Social History     Tobacco Use     Smoking status: Current Every Day Smoker     Packs/day: 0.25     Smokeless tobacco: Never Used   Substance Use Topics     Alcohol use: Not Currently       Family History   Problem Relation Age of Onset     Hearing Loss Mother      Breast Cancer Paternal Grandmother      Breast Cancer Other         paternal aunts x's 3       ROS:   CONSTITUTIONAL:NEGATIVE for fever, chills,    INTEGUMENTARY/SKIN: NEGATIVE for worrisome rashes, or lesions  EYES: NEGATIVE for vision changes or irritation  RESP:NEGATIVE for significant cough or SOB  GI: NEGATIVE for nausea, abdominal pain,   or change in bowel habits    OBJECTIVE:  /64 (BP Location: Right arm, Patient Position: Chair, Cuff Size: Adult Regular)   Pulse 77   Temp 98.3  F (36.8  C) (Oral)   Resp 18   Wt 74.8 kg (165 lb)   LMP 04/15/2022 (Approximate)   SpO2 99%   Breastfeeding No   BMI 29.23 kg/m    GENERAL APPEARANCE: alert, moderate distress and cooperative  RESP: lungs clear to auscultation - no rales, rhonchi or wheezes  CV: regular rates and rhythm, normal S1 S2, no murmur noted  ABDOMEN:  soft, nontender, no HSM or masses and bowel sounds normal  BACK: bilateral  CVA tenderness  SKIN: no suspicious lesions or rashes    Lab:    Results for orders placed or performed in visit on 05/05/22   Urine Microscopic     Status: Abnormal   Result Value Ref Range    Bacteria Urine Moderate (A) None Seen /HPF    RBC Urine None Seen 0-2 /HPF /HPF    WBC Urine None Seen 0-5 /HPF /HPF    Squamous Epithelials Urine Few (A) None Seen /LPF    Mucus Urine Present (A) None Seen /LPF    Narrative    Urine Culture not indicated   Results for orders placed or performed in visit on 05/05/22   UA macro with reflex to Microscopic and  Culture - Clinc Collect     Status: Normal    Specimen: Urine, Clean Catch   Result Value Ref Range    Color Urine Yellow Colorless, Straw, Light Yellow, Yellow    Appearance Urine Clear Clear    Glucose Urine Negative Negative mg/dL    Bilirubin Urine Negative Negative    Ketones Urine Negative Negative mg/dL    Specific Gravity Urine 1.025 1.003 - 1.035    Blood Urine Negative Negative    pH Urine 5.5 5.0 - 7.0    Protein Albumin Urine Negative Negative mg/dL    Urobilinogen Urine 0.2 0.2, 1.0 E.U./dL    Nitrite Urine Negative Negative    Leukocyte Esterase Urine Negative Negative    Narrative    Microscopic not indicated

## 2022-05-07 LAB — BACTERIA UR CULT: NO GROWTH

## 2022-06-08 ENCOUNTER — OFFICE VISIT (OUTPATIENT)
Dept: FAMILY MEDICINE | Facility: CLINIC | Age: 37
End: 2022-06-08
Payer: COMMERCIAL

## 2022-06-08 VITALS
DIASTOLIC BLOOD PRESSURE: 80 MMHG | RESPIRATION RATE: 15 BRPM | OXYGEN SATURATION: 97 % | SYSTOLIC BLOOD PRESSURE: 115 MMHG | HEIGHT: 60 IN | TEMPERATURE: 99.1 F | BODY MASS INDEX: 34.65 KG/M2 | HEART RATE: 86 BPM | WEIGHT: 176.5 LBS

## 2022-06-08 DIAGNOSIS — Z12.4 CERVICAL CANCER SCREENING: ICD-10-CM

## 2022-06-08 DIAGNOSIS — Z12.31 ENCOUNTER FOR SCREENING MAMMOGRAM FOR BREAST CANCER: ICD-10-CM

## 2022-06-08 DIAGNOSIS — G89.4 CHRONIC PAIN SYNDROME: ICD-10-CM

## 2022-06-08 DIAGNOSIS — F17.200 NICOTINE USE DISORDER: ICD-10-CM

## 2022-06-08 DIAGNOSIS — Z00.00 VISIT FOR PREVENTIVE HEALTH EXAMINATION: Primary | ICD-10-CM

## 2022-06-08 PROCEDURE — 99395 PREV VISIT EST AGE 18-39: CPT | Performed by: FAMILY MEDICINE

## 2022-06-08 PROCEDURE — 99213 OFFICE O/P EST LOW 20 MIN: CPT | Mod: 25 | Performed by: FAMILY MEDICINE

## 2022-06-08 PROCEDURE — 87624 HPV HI-RISK TYP POOLED RSLT: CPT | Performed by: FAMILY MEDICINE

## 2022-06-08 PROCEDURE — G0145 SCR C/V CYTO,THINLAYER,RESCR: HCPCS | Performed by: FAMILY MEDICINE

## 2022-06-08 RX ORDER — BUPROPION HYDROCHLORIDE 150 MG/1
150 TABLET, EXTENDED RELEASE ORAL 2 TIMES DAILY
Qty: 60 TABLET | Refills: 3 | Status: SHIPPED | OUTPATIENT
Start: 2022-06-08 | End: 2022-10-10

## 2022-06-08 RX ORDER — DULOXETINE 40 MG/1
CAPSULE, DELAYED RELEASE ORAL
COMMUNITY
Start: 2022-05-31 | End: 2023-08-30

## 2022-06-08 SDOH — HEALTH STABILITY: PHYSICAL HEALTH: ON AVERAGE, HOW MANY DAYS PER WEEK DO YOU ENGAGE IN MODERATE TO STRENUOUS EXERCISE (LIKE A BRISK WALK)?: 3 DAYS

## 2022-06-08 SDOH — ECONOMIC STABILITY: INCOME INSECURITY: IN THE LAST 12 MONTHS, WAS THERE A TIME WHEN YOU WERE NOT ABLE TO PAY THE MORTGAGE OR RENT ON TIME?: YES

## 2022-06-08 SDOH — ECONOMIC STABILITY: FOOD INSECURITY: WITHIN THE PAST 12 MONTHS, THE FOOD YOU BOUGHT JUST DIDN'T LAST AND YOU DIDN'T HAVE MONEY TO GET MORE.: NEVER TRUE

## 2022-06-08 SDOH — ECONOMIC STABILITY: INCOME INSECURITY: HOW HARD IS IT FOR YOU TO PAY FOR THE VERY BASICS LIKE FOOD, HOUSING, MEDICAL CARE, AND HEATING?: NOT HARD AT ALL

## 2022-06-08 SDOH — ECONOMIC STABILITY: FOOD INSECURITY: WITHIN THE PAST 12 MONTHS, YOU WORRIED THAT YOUR FOOD WOULD RUN OUT BEFORE YOU GOT MONEY TO BUY MORE.: NEVER TRUE

## 2022-06-08 SDOH — ECONOMIC STABILITY: TRANSPORTATION INSECURITY
IN THE PAST 12 MONTHS, HAS THE LACK OF TRANSPORTATION KEPT YOU FROM MEDICAL APPOINTMENTS OR FROM GETTING MEDICATIONS?: NO

## 2022-06-08 SDOH — HEALTH STABILITY: PHYSICAL HEALTH: ON AVERAGE, HOW MANY MINUTES DO YOU ENGAGE IN EXERCISE AT THIS LEVEL?: 40 MIN

## 2022-06-08 SDOH — ECONOMIC STABILITY: TRANSPORTATION INSECURITY
IN THE PAST 12 MONTHS, HAS LACK OF TRANSPORTATION KEPT YOU FROM MEETINGS, WORK, OR FROM GETTING THINGS NEEDED FOR DAILY LIVING?: NO

## 2022-06-08 ASSESSMENT — ENCOUNTER SYMPTOMS
HEMATOCHEZIA: 0
JOINT SWELLING: 1
ABDOMINAL PAIN: 0
FREQUENCY: 0
NAUSEA: 0
MYALGIAS: 1
HEADACHES: 1
DIZZINESS: 0
SORE THROAT: 0
PALPITATIONS: 0
BREAST MASS: 0
CHILLS: 0
ARTHRALGIAS: 1
PARESTHESIAS: 0
NERVOUS/ANXIOUS: 1
FEVER: 0
HEMATURIA: 0
SHORTNESS OF BREATH: 0
CONSTIPATION: 0
DIARRHEA: 0
WEAKNESS: 0
COUGH: 0
DYSURIA: 0
HEARTBURN: 1
EYE PAIN: 0

## 2022-06-08 ASSESSMENT — SOCIAL DETERMINANTS OF HEALTH (SDOH)
HOW OFTEN DO YOU ATTEND CHURCH OR RELIGIOUS SERVICES?: NEVER
DO YOU BELONG TO ANY CLUBS OR ORGANIZATIONS SUCH AS CHURCH GROUPS UNIONS, FRATERNAL OR ATHLETIC GROUPS, OR SCHOOL GROUPS?: NO
IN A TYPICAL WEEK, HOW MANY TIMES DO YOU TALK ON THE PHONE WITH FAMILY, FRIENDS, OR NEIGHBORS?: ONCE A WEEK
HOW OFTEN DO YOU GET TOGETHER WITH FRIENDS OR RELATIVES?: ONCE A WEEK

## 2022-06-08 ASSESSMENT — LIFESTYLE VARIABLES
SKIP TO QUESTIONS 9-10: 1
HOW OFTEN DO YOU HAVE A DRINK CONTAINING ALCOHOL: MONTHLY OR LESS
AUDIT-C TOTAL SCORE: 1
HOW MANY STANDARD DRINKS CONTAINING ALCOHOL DO YOU HAVE ON A TYPICAL DAY: 1 OR 2
HOW OFTEN DO YOU HAVE SIX OR MORE DRINKS ON ONE OCCASION: NEVER

## 2022-06-08 ASSESSMENT — PATIENT HEALTH QUESTIONNAIRE - PHQ9
SUM OF ALL RESPONSES TO PHQ QUESTIONS 1-9: 12
SUM OF ALL RESPONSES TO PHQ QUESTIONS 1-9: 12
10. IF YOU CHECKED OFF ANY PROBLEMS, HOW DIFFICULT HAVE THESE PROBLEMS MADE IT FOR YOU TO DO YOUR WORK, TAKE CARE OF THINGS AT HOME, OR GET ALONG WITH OTHER PEOPLE: SOMEWHAT DIFFICULT

## 2022-06-08 ASSESSMENT — ANXIETY QUESTIONNAIRES
GAD7 TOTAL SCORE: 13
3. WORRYING TOO MUCH ABOUT DIFFERENT THINGS: NEARLY EVERY DAY
7. FEELING AFRAID AS IF SOMETHING AWFUL MIGHT HAPPEN: MORE THAN HALF THE DAYS
4. TROUBLE RELAXING: SEVERAL DAYS
6. BECOMING EASILY ANNOYED OR IRRITABLE: MORE THAN HALF THE DAYS
7. FEELING AFRAID AS IF SOMETHING AWFUL MIGHT HAPPEN: MORE THAN HALF THE DAYS
GAD7 TOTAL SCORE: 13
1. FEELING NERVOUS, ANXIOUS, OR ON EDGE: MORE THAN HALF THE DAYS
5. BEING SO RESTLESS THAT IT IS HARD TO SIT STILL: NOT AT ALL
8. IF YOU CHECKED OFF ANY PROBLEMS, HOW DIFFICULT HAVE THESE MADE IT FOR YOU TO DO YOUR WORK, TAKE CARE OF THINGS AT HOME, OR GET ALONG WITH OTHER PEOPLE?: SOMEWHAT DIFFICULT
2. NOT BEING ABLE TO STOP OR CONTROL WORRYING: NEARLY EVERY DAY
GAD7 TOTAL SCORE: 13

## 2022-06-08 NOTE — NURSING NOTE
/80   Pulse 86   Temp 99.1  F (37.3  C) (Oral)   Resp 15   Ht 1.524 m (5')   Wt 80.1 kg (176 lb 8 oz)   LMP 04/10/2022   SpO2 97%   BMI 34.47 kg/m    Patient in for Female Px.

## 2022-06-08 NOTE — PROGRESS NOTES
SUBJECTIVE:   CC: Karely Munoz is an 36 year old woman who presents for preventive health visit.       Patient has been advised of split billing requirements and indicates understanding: Yes  Healthy Habits:     Getting at least 3 servings of Calcium per day:  Yes    Bi-annual eye exam:  Yes    Dental care twice a year:  Yes    Sleep apnea or symptoms of sleep apnea:  None    Diet:  Regular (no restrictions)    Frequency of exercise:  2-3 days/week    Duration of exercise:  30-45 minutes    Taking medications regularly:  Yes    Medication side effects:  None    PHQ-2 Total Score: 3    Additional concerns today:  No        Today's PHQ-2 Score:   PHQ-2 ( 1999 Pfizer) 6/8/2022   Q1: Little interest or pleasure in doing things 1   Q2: Feeling down, depressed or hopeless 2   PHQ-2 Score 3   PHQ-2 Total Score (12-17 Years)- Positive if 3 or more points; Administer PHQ-A if positive -   Q1: Little interest or pleasure in doing things Several days   Q2: Feeling down, depressed or hopeless More than half the days   PHQ-2 Score 3       Abuse: Current or Past (Physical, Sexual or Emotional) - No  Do you feel safe in your environment? Yes    Have you ever done Advance Care Planning? (For example, a Health Directive, POLST, or a discussion with a medical provider or your loved ones about your wishes): No, advance care planning information given to patient to review.  Patient declined advance care planning discussion at this time.    Social History     Tobacco Use     Smoking status: Current Every Day Smoker     Packs/day: 0.25     Smokeless tobacco: Never Used   Substance Use Topics     Alcohol use: Not Currently     If you drink alcohol do you typically have >3 drinks per day or >7 drinks per week? No    Alcohol Use 6/8/2022   Prescreen: >3 drinks/day or >7 drinks/week? No   Prescreen: >3 drinks/day or >7 drinks/week? -   No flowsheet data found.    Reviewed orders with patient.  Reviewed health maintenance and updated  orders accordingly - Yes  Labs reviewed in EPIC    Breast Cancer Screening:  Any new diagnosis of family breast, ovarian, or bowel cancer? No    FHS-7:   Breast CA Risk Assessment (FHS-7) 6/8/2022   Did any of your first-degree relatives have breast or ovarian cancer? Yes   Did any of your relatives have bilateral breast cancer? No   Did any man in your family have breast cancer? No   Did any woman in your family have breast and ovarian cancer? Yes   Did any woman in your family have breast cancer before age 50 y? Yes   Do you have 2 or more relatives with breast and/or ovarian cancer? No   Do you have 2 or more relatives with breast and/or bowel cancer? No       Mammogram Screening - Alternate mammogram schedule due to breast cancer history  Pertinent mammograms are reviewed under the imaging tab.    History of abnormal Pap smear: NO - age 30-65 PAP every 5 years with negative HPV co-testing recommended  PAP / HPV 5/9/2019   PAP (Historical) NIL, neg HPV     Reviewed and updated as needed this visit by clinical staff   Tobacco  Allergies  Meds                Reviewed and updated as needed this visit by Provider     Meds                   Review of Systems   Constitutional: Negative for chills and fever.   HENT: Negative for congestion, ear pain, hearing loss and sore throat.    Eyes: Negative for pain and visual disturbance.   Respiratory: Negative for cough and shortness of breath.    Cardiovascular: Negative for chest pain, palpitations and peripheral edema.   Gastrointestinal: Positive for heartburn. Negative for abdominal pain, constipation, diarrhea, hematochezia and nausea.   Breasts:  Positive for tenderness. Negative for breast mass and discharge.   Genitourinary: Negative for dysuria, frequency, genital sores, hematuria, pelvic pain, urgency, vaginal bleeding and vaginal discharge.   Musculoskeletal: Positive for arthralgias, joint swelling and myalgias.   Skin: Positive for rash.   Neurological:  Positive for headaches. Negative for dizziness, weakness and paresthesias.   Psychiatric/Behavioral: Negative for mood changes. The patient is nervous/anxious.           OBJECTIVE:   /80   Pulse 86   Temp 99.1  F (37.3  C) (Oral)   Resp 15   Ht 1.524 m (5')   Wt 80.1 kg (176 lb 8 oz)   LMP 04/10/2022   SpO2 97%   BMI 34.47 kg/m    Physical Exam  Vitals reviewed. Exam conducted with a chaperone present (PACO Johnson).   Constitutional:       General: She is not in acute distress.     Appearance: Normal appearance. She is not ill-appearing or diaphoretic.   HENT:      Head: Normocephalic and atraumatic.      Right Ear: Tympanic membrane normal.      Left Ear: Tympanic membrane normal.      Nose: Nose normal. No congestion or rhinorrhea.      Mouth/Throat:      Mouth: Mucous membranes are moist.      Pharynx: Oropharynx is clear.   Eyes:      General: No scleral icterus.        Right eye: No discharge.         Left eye: No discharge.      Extraocular Movements: Extraocular movements intact.      Pupils: Pupils are equal, round, and reactive to light.   Cardiovascular:      Rate and Rhythm: Normal rate and regular rhythm.      Heart sounds: No murmur heard.  Pulmonary:      Effort: Pulmonary effort is normal.      Breath sounds: Normal breath sounds.   Chest:   Breasts:      Right: Normal. No swelling, bleeding, inverted nipple, mass, nipple discharge, skin change, tenderness, axillary adenopathy or supraclavicular adenopathy.      Left: Normal. No swelling, bleeding, inverted nipple, mass, nipple discharge, skin change, tenderness, axillary adenopathy or supraclavicular adenopathy.       Abdominal:      General: Abdomen is flat. There is no distension.   Genitourinary:     General: Normal vulva.      Vagina: No vaginal discharge.      Comments: Bleeding from the cervical os    No adnexal masses or tenderness, unable to palpate uterine fundus, no cervical motion tenderness, normal vaginal  tone  Musculoskeletal:         General: No swelling.      Cervical back: Normal range of motion.   Lymphadenopathy:      Cervical: No cervical adenopathy.      Upper Body:      Right upper body: No supraclavicular, axillary or pectoral adenopathy.      Left upper body: No supraclavicular, axillary or pectoral adenopathy.   Skin:     General: Skin is warm.      Capillary Refill: Capillary refill takes less than 2 seconds.   Neurological:      General: No focal deficit present.      Mental Status: She is alert.   Psychiatric:         Mood and Affect: Mood normal.         Behavior: Behavior normal.         Thought Content: Thought content normal.         Judgment: Judgment normal.           Diagnostic Test Results:  Labs reviewed in Epic    ASSESSMENT/PLAN:   Karely was seen today for physical.    Diagnoses and all orders for this visit:    Visit for preventive health examination    Cervical cancer screening  -     Pap screen with HPV - recommended age 30 - 65 years    Encounter for screening mammogram for breast cancer  -     MA Screen Bilateral w/Kade; Future    Chronic pain syndrome  Continue current medical management.  -     acetaminophen-codeine (TYLENOL #3) 300-30 MG tablet; Take 1 tablet by mouth every 6 hours as needed for severe pain    Nicotine use disorder  Declined Chantix, family history of suicidality on Chantix.  Start Wellbutrin.  - buPROPion (WELLBUTRIN SR) 150 MG 12 hr tablet; Take 1 tablet (150 mg) by mouth 2 times daily    Patient has been advised of split billing requirements and indicates understanding: Yes    COUNSELING:  Reviewed preventive health counseling, as reflected in patient instructions       Regular exercise       Healthy diet/nutrition    Estimated body mass index is 34.47 kg/m  as calculated from the following:    Height as of this encounter: 1.524 m (5').    Weight as of this encounter: 80.1 kg (176 lb 8 oz).    Weight management plan: Discussed healthy diet and exercise  guidelines    She reports that she has been smoking. She has been smoking about 0.25 packs per day. She has never used smokeless tobacco.  Tobacco Cessation Action Plan:   Pharmacotherapies : Zyban/Wellbutrin      Counseling Resources:  ATP IV Guidelines  Pooled Cohorts Equation Calculator  Breast Cancer Risk Calculator  BRCA-Related Cancer Risk Assessment: FHS-7 Tool  FRAX Risk Assessment  ICSI Preventive Guidelines  Dietary Guidelines for Americans, 2010  [employer] MyPlate  ASA Prophylaxis  Lung CA Screening    Eric Jones MD  [facility]  Answers for HPI/ROS submitted by the patient on 6/8/2022  If you checked off any problems, how difficult have these problems made it for you to do your work, take care of things at home, or get along with other people?: Somewhat difficult  PHQ9 TOTAL SCORE: 12  MARISA 7 TOTAL SCORE: 13

## 2022-06-13 LAB
BKR LAB AP GYN ADEQUACY: NORMAL
BKR LAB AP GYN INTERPRETATION: NORMAL
BKR LAB AP HPV REFLEX: NORMAL
BKR LAB AP LMP: NORMAL
BKR LAB AP PREVIOUS ABNORMAL: NORMAL
PATH REPORT.COMMENTS IMP SPEC: NORMAL
PATH REPORT.COMMENTS IMP SPEC: NORMAL
PATH REPORT.RELEVANT HX SPEC: NORMAL

## 2022-06-15 LAB
HUMAN PAPILLOMA VIRUS 16 DNA: NEGATIVE
HUMAN PAPILLOMA VIRUS 18 DNA: NEGATIVE
HUMAN PAPILLOMA VIRUS FINAL DIAGNOSIS: NORMAL
HUMAN PAPILLOMA VIRUS OTHER HR: NEGATIVE

## 2022-07-11 ENCOUNTER — ANCILLARY PROCEDURE (OUTPATIENT)
Dept: MAMMOGRAPHY | Facility: CLINIC | Age: 37
End: 2022-07-11
Attending: FAMILY MEDICINE
Payer: COMMERCIAL

## 2022-07-11 DIAGNOSIS — Z12.31 ENCOUNTER FOR SCREENING MAMMOGRAM FOR BREAST CANCER: ICD-10-CM

## 2022-07-11 PROCEDURE — 77063 BREAST TOMOSYNTHESIS BI: CPT | Mod: TC | Performed by: RADIOLOGY

## 2022-07-11 PROCEDURE — 77067 SCR MAMMO BI INCL CAD: CPT | Mod: TC | Performed by: RADIOLOGY

## 2022-07-26 DIAGNOSIS — G89.4 CHRONIC PAIN SYNDROME: ICD-10-CM

## 2022-08-10 ENCOUNTER — OFFICE VISIT (OUTPATIENT)
Dept: URGENT CARE | Facility: URGENT CARE | Age: 37
End: 2022-08-10
Payer: COMMERCIAL

## 2022-08-10 VITALS
TEMPERATURE: 98.6 F | RESPIRATION RATE: 16 BRPM | HEART RATE: 91 BPM | DIASTOLIC BLOOD PRESSURE: 66 MMHG | OXYGEN SATURATION: 96 % | SYSTOLIC BLOOD PRESSURE: 98 MMHG

## 2022-08-10 DIAGNOSIS — R07.0 THROAT PAIN: Primary | ICD-10-CM

## 2022-08-10 LAB — DEPRECATED S PYO AG THROAT QL EIA: NEGATIVE

## 2022-08-10 PROCEDURE — 87651 STREP A DNA AMP PROBE: CPT | Performed by: FAMILY MEDICINE

## 2022-08-10 PROCEDURE — 99213 OFFICE O/P EST LOW 20 MIN: CPT | Performed by: FAMILY MEDICINE

## 2022-08-11 LAB — GROUP A STREP BY PCR: NOT DETECTED

## 2022-08-11 NOTE — PROGRESS NOTES
Assessment & Plan     Throat pain    - Streptococcus A Rapid Screen w/Reflex to PCR - Clinic Collect  - Group A Streptococcus PCR Throat Swab    Mom and patient told RST is negative elsa.  I recommended a COVID swab explaining this is likely COVID and mom became angry upset that her daughter's throat bled when she was tested for strep today.  She stormed out saying they will test for COVID at home deferrng a PCR and further exam in clinic elsa Hawthorne MD  [facility] [place]    Felice Cali is a 36 year old, presenting for the following health issues:  Urgent Care and Pharyngitis (Sore throat and fever for 3 days-Also some SOB-Neg COVID test/)      HPI     Presents with daughter both with ST and fever x 3 days.  Both are unvaccinated.  Had negative home COVID test.      Review of Systems   Constitutional, HEENT, cardiovascular, pulmonary, GI, , musculoskeletal, neuro, skin, endocrine and psych systems are negative, except as otherwise noted.      Objective    BP 98/66   Pulse 91   Temp 98.6  F (37  C) (Tympanic)   Resp 16   LMP 07/17/2022   SpO2 96%   Breastfeeding No   There is no height or weight on file to calculate BMI.  Physical Exam   GENERAL: healthy, alert and no distress  PSYCH: mentation appears normal, affect normal/bright  No exam done as mom became angry as RST was reported as negative and did not want further testing and left.    Results for orders placed or performed in visit on 08/10/22 (from the past 24 hour(s))   Streptococcus A Rapid Screen w/Reflex to PCR - Clinic Collect    Specimen: Throat; Swab   Result Value Ref Range    Group A Strep antigen Negative Negative                   .  ..

## 2022-10-04 PROBLEM — O35.10X0 MATERNAL CARE FOR SUSPECTED CHROMOSOMAL ABNORMALITY IN FETUS: Status: RESOLVED | Noted: 2019-09-11 | Resolved: 2020-01-03

## 2022-10-09 DIAGNOSIS — F17.200 NICOTINE USE DISORDER: ICD-10-CM

## 2022-10-10 RX ORDER — BUPROPION HYDROCHLORIDE 150 MG/1
150 TABLET, EXTENDED RELEASE ORAL 2 TIMES DAILY
Qty: 180 TABLET | Refills: 3 | Status: SHIPPED | OUTPATIENT
Start: 2022-10-10 | End: 2023-08-30

## 2022-10-10 NOTE — TELEPHONE ENCOUNTER
Routing refill request to provider for review/approval because:  Drug interaction warning    Jaqueline SIMPSON RN

## 2022-10-15 ENCOUNTER — HEALTH MAINTENANCE LETTER (OUTPATIENT)
Age: 37
End: 2022-10-15

## 2022-11-11 ENCOUNTER — MYC MEDICAL ADVICE (OUTPATIENT)
Dept: FAMILY MEDICINE | Facility: CLINIC | Age: 37
End: 2022-11-11

## 2022-11-14 NOTE — TELEPHONE ENCOUNTER
Summary:    Patient is due/failing the following:   CSA and ACT    Reviewed:    [x] CARE EVERYWHERE  [x] LAST OV NOTE   [x] FYI TAB  [x] MYCHART ACTIVE?  [x] LAST PANEL ENCOUNTER  [x] FUTURE APPTS  [x] IMMUNIZATIONS  [x] Media Tab  Talked to patient, she has never had a diagnosis of asthma.  Action needed:       Type of outreach:    Phone, spoke to patient.  wants asthma removed from chart                                                                               Niya Garner CMA

## 2023-05-09 ENCOUNTER — PATIENT OUTREACH (OUTPATIENT)
Dept: CARE COORDINATION | Facility: CLINIC | Age: 38
End: 2023-05-09
Payer: COMMERCIAL

## 2023-05-23 ENCOUNTER — PATIENT OUTREACH (OUTPATIENT)
Dept: CARE COORDINATION | Facility: CLINIC | Age: 38
End: 2023-05-23
Payer: COMMERCIAL

## 2023-08-20 ENCOUNTER — HEALTH MAINTENANCE LETTER (OUTPATIENT)
Age: 38
End: 2023-08-20

## 2023-08-30 ENCOUNTER — OFFICE VISIT (OUTPATIENT)
Dept: FAMILY MEDICINE | Facility: CLINIC | Age: 38
End: 2023-08-30
Payer: COMMERCIAL

## 2023-08-30 VITALS
BODY MASS INDEX: 27.66 KG/M2 | WEIGHT: 162 LBS | TEMPERATURE: 98.1 F | RESPIRATION RATE: 18 BRPM | HEART RATE: 94 BPM | HEIGHT: 64 IN | DIASTOLIC BLOOD PRESSURE: 62 MMHG | OXYGEN SATURATION: 100 % | SYSTOLIC BLOOD PRESSURE: 98 MMHG

## 2023-08-30 DIAGNOSIS — Z11.3 ROUTINE SCREENING FOR STI (SEXUALLY TRANSMITTED INFECTION): Primary | ICD-10-CM

## 2023-08-30 DIAGNOSIS — G89.4 CHRONIC PAIN SYNDROME: ICD-10-CM

## 2023-08-30 DIAGNOSIS — F41.0 PANIC ATTACK: ICD-10-CM

## 2023-08-30 DIAGNOSIS — Z11.59 NEED FOR HEPATITIS C SCREENING TEST: ICD-10-CM

## 2023-08-30 LAB
ALBUMIN UR-MCNC: ABNORMAL MG/DL
APPEARANCE UR: CLEAR
BACTERIA #/AREA URNS HPF: ABNORMAL /HPF
BILIRUB UR QL STRIP: NEGATIVE
CLUE CELLS: ABNORMAL
COLOR UR AUTO: YELLOW
CREAT UR-MCNC: 194 MG/DL
GLUCOSE UR STRIP-MCNC: NEGATIVE MG/DL
HGB UR QL STRIP: NEGATIVE
KETONES UR STRIP-MCNC: NEGATIVE MG/DL
LEUKOCYTE ESTERASE UR QL STRIP: NEGATIVE
MUCOUS THREADS #/AREA URNS LPF: PRESENT /LPF
NITRATE UR QL: NEGATIVE
PH UR STRIP: 5.5 [PH] (ref 5–7)
RBC #/AREA URNS AUTO: ABNORMAL /HPF
SP GR UR STRIP: >=1.03 (ref 1–1.03)
SQUAMOUS #/AREA URNS AUTO: ABNORMAL /LPF
TRICHOMONAS, WET PREP: ABNORMAL
UROBILINOGEN UR STRIP-ACNC: 0.2 E.U./DL
WBC #/AREA URNS AUTO: ABNORMAL /HPF
WBC'S/HIGH POWER FIELD, WET PREP: ABNORMAL
YEAST, WET PREP: ABNORMAL

## 2023-08-30 PROCEDURE — 81001 URINALYSIS AUTO W/SCOPE: CPT | Performed by: FAMILY MEDICINE

## 2023-08-30 PROCEDURE — 87591 N.GONORRHOEAE DNA AMP PROB: CPT | Performed by: FAMILY MEDICINE

## 2023-08-30 PROCEDURE — 99214 OFFICE O/P EST MOD 30 MIN: CPT | Performed by: FAMILY MEDICINE

## 2023-08-30 PROCEDURE — 36415 COLL VENOUS BLD VENIPUNCTURE: CPT | Performed by: FAMILY MEDICINE

## 2023-08-30 PROCEDURE — 87491 CHLMYD TRACH DNA AMP PROBE: CPT | Performed by: FAMILY MEDICINE

## 2023-08-30 PROCEDURE — 87389 HIV-1 AG W/HIV-1&-2 AB AG IA: CPT | Performed by: FAMILY MEDICINE

## 2023-08-30 PROCEDURE — G0480 DRUG TEST DEF 1-7 CLASSES: HCPCS | Performed by: FAMILY MEDICINE

## 2023-08-30 PROCEDURE — 86780 TREPONEMA PALLIDUM: CPT | Performed by: FAMILY MEDICINE

## 2023-08-30 PROCEDURE — 87210 SMEAR WET MOUNT SALINE/INK: CPT | Performed by: FAMILY MEDICINE

## 2023-08-30 RX ORDER — LORAZEPAM 0.5 MG/1
0.5 TABLET ORAL EVERY 6 HOURS PRN
Qty: 30 TABLET | Refills: 0 | Status: SHIPPED | OUTPATIENT
Start: 2023-08-30

## 2023-08-30 RX ORDER — DULOXETIN HYDROCHLORIDE 60 MG/1
60 CAPSULE, DELAYED RELEASE ORAL DAILY
Qty: 90 CAPSULE | Refills: 0 | COMMUNITY
Start: 2023-08-30

## 2023-08-30 RX ORDER — ACETAMINOPHEN AND CODEINE PHOSPHATE 300; 30 MG/1; MG/1
1 TABLET ORAL EVERY 6 HOURS PRN
Qty: 30 TABLET | Refills: 0 | Status: SHIPPED | OUTPATIENT
Start: 2023-08-30

## 2023-08-30 ASSESSMENT — ASTHMA QUESTIONNAIRES: ACT_TOTALSCORE: 19

## 2023-08-30 ASSESSMENT — PAIN SCALES - GENERAL: PAINLEVEL: NO PAIN (0)

## 2023-08-30 NOTE — PROGRESS NOTES
Assessment & Plan     Routine screening for STI (sexually transmitted infection)  Check labs, will contact patient if positive.  Recently underwent treatment for bacterial vaginosis, status post completion of metronidazole.  - [id number] - Urine Drug Confirmation Panel (Comprehensive)  - NEISSERIA GONORRHOEA PCR  - HIV Antigen Antibody Combo  - Treponema Abs w Reflex to RPR and Titer  - UA with Microscopic reflex to Culture - lab collect  - Wet prep - lab collect  - CHLAMYDIA TRACHOMATIS PCR  - [id number] - Urine Drug Confirmation Panel (Comprehensive)  - NEISSERIA GONORRHOEA PCR  - HIV Antigen Antibody Combo  - Treponema Abs w Reflex to RPR and Titer  - UA with Microscopic reflex to Culture - lab collect  - Wet prep - lab collect  - CHLAMYDIA TRACHOMATIS PCR  - UA Microscopic with Reflex to Culture    Chronic pain syndrome  PDMP reviewed, continue current medical management  - acetaminophen-codeine (TYLENOL #3) 300-30 MG per tablet  Dispense: 30 tablet; Refill: 0    Panic attack  PDMP reviewed, continue current medical management.  Discussed risks of concomitant benzodiazepine and opiate use for risk of respiratory depression which could be fatal; unfortunately other regiments have not helped and she's aware to not use these meds within 4-6 hours of each other.   - LORazepam (ATIVAN) 0.5 MG tablet  Dispense: 30 tablet; Refill: 0        Eric Jones MD  [facility]    Felice Cali is a 37 year old, presenting for the following health issues:  Vaginal Problem      8/30/2023     9:10 AM   Additional Questions   Roomed by Ana RICHMOND       History of Present Illness       Reason for visit:  Std testing    She eats 4 or more servings of fruits and vegetables daily.She consumes 1 sweetened beverage(s) daily.She exercises with enough effort to increase her heart rate 30 to 60 minutes per day.  She exercises with enough effort to increase her heart rate 6 days per week.   She is taking medications  "regularly.     Vaginal Symptoms  Onset/Duration: 3 years   Description:  Vaginal Discharge: none   Itching (Pruritis): YES  Burning sensation:  No  Odor: No  Accompanying Signs & Symptoms:  Urinary symptoms: Sometimes  Abdominal pain: No  Fever: No  History:   Sexually active: No  New Partner: No  Possibility of Pregnancy:  No  Recent antibiotic use: No  Previous vaginitis issues: YES  Precipitating or alleviating factors: None  Therapies tried and outcome: none    Patient is a 37-year-old female presents for concerns of STI exposure, management of chronic pain syndrome and generalized anxiety disorder    Review of Systems   Genitourinary:  Positive for vaginal discharge.            Objective    BP 98/62 (BP Location: Right arm, Patient Position: Sitting, Cuff Size: Adult Regular)   Pulse 94   Temp 98.1  F (36.7  C) (Oral)   Resp 18   Ht 1.619 m (5' 3.75\")   Wt 73.5 kg (162 lb)   LMP 08/07/2023 (Approximate)   SpO2 100%   BMI 28.03 kg/m    Body mass index is 28.03 kg/m .  Physical Exam  Vitals reviewed.   Constitutional:       Appearance: She is not ill-appearing.   Cardiovascular:      Rate and Rhythm: Normal rate and regular rhythm.   Pulmonary:      Effort: Pulmonary effort is normal.      Breath sounds: Normal breath sounds.   Genitourinary:     Comments: No inguinal adenopathy   Neurological:      Cranial Nerves: No cranial nerve deficit.   Psychiatric:         Mood and Affect: Mood normal.         Behavior: Behavior normal.                              "

## 2023-08-31 LAB
C TRACH DNA SPEC QL NAA+PROBE: NEGATIVE
HIV 1+2 AB+HIV1 P24 AG SERPL QL IA: NONREACTIVE
N GONORRHOEA DNA SPEC QL NAA+PROBE: NEGATIVE
T PALLIDUM AB SER QL: NONREACTIVE

## 2023-09-01 LAB — LORAZEPAM UR QL CFM: PRESENT

## 2023-11-24 DIAGNOSIS — F17.200 NICOTINE USE DISORDER: ICD-10-CM

## 2023-11-24 RX ORDER — BUPROPION HYDROCHLORIDE 150 MG/1
150 TABLET, EXTENDED RELEASE ORAL 2 TIMES DAILY
Qty: 180 TABLET | Refills: 3 | Status: SHIPPED | OUTPATIENT
Start: 2023-11-24

## 2024-01-22 ENCOUNTER — TELEPHONE (OUTPATIENT)
Dept: FAMILY MEDICINE | Facility: CLINIC | Age: 39
End: 2024-01-22
Payer: COMMERCIAL

## 2024-01-22 NOTE — TELEPHONE ENCOUNTER
Summary:    Patient is due/failing the following:   ACT and PHYSICAL    Reviewed:    [] CARE EVERYWHERE  [] LAST OV NOTE   [] FYI TAB  [] MYCHART ACTIVE?  [] LAST PANEL ENCOUNTER  [] FUTURE APPTS  [] IMMUNIZATIONS  [] Media Tab  Action needed:   Patient needs office visit for physical.    Type of outreach:    Sent [employer] message.                                                                               Niya Garner CMA

## 2024-04-15 PROCEDURE — 93005 ELECTROCARDIOGRAM TRACING: CPT

## 2024-04-15 PROCEDURE — 96374 THER/PROPH/DIAG INJ IV PUSH: CPT | Mod: 59

## 2024-04-15 PROCEDURE — 99285 EMERGENCY DEPT VISIT HI MDM: CPT | Mod: 25

## 2024-04-15 PROCEDURE — 87637 SARSCOV2&INF A&B&RSV AMP PRB: CPT | Performed by: EMERGENCY MEDICINE

## 2024-04-15 ASSESSMENT — COLUMBIA-SUICIDE SEVERITY RATING SCALE - C-SSRS
6. HAVE YOU EVER DONE ANYTHING, STARTED TO DO ANYTHING, OR PREPARED TO DO ANYTHING TO END YOUR LIFE?: NO
2. HAVE YOU ACTUALLY HAD ANY THOUGHTS OF KILLING YOURSELF IN THE PAST MONTH?: NO
1. IN THE PAST MONTH, HAVE YOU WISHED YOU WERE DEAD OR WISHED YOU COULD GO TO SLEEP AND NOT WAKE UP?: NO

## 2024-04-16 ENCOUNTER — APPOINTMENT (OUTPATIENT)
Dept: MRI IMAGING | Facility: CLINIC | Age: 39
End: 2024-04-16
Attending: EMERGENCY MEDICINE
Payer: COMMERCIAL

## 2024-04-16 ENCOUNTER — APPOINTMENT (OUTPATIENT)
Dept: GENERAL RADIOLOGY | Facility: CLINIC | Age: 39
End: 2024-04-16
Attending: EMERGENCY MEDICINE
Payer: COMMERCIAL

## 2024-04-16 ENCOUNTER — HOSPITAL ENCOUNTER (EMERGENCY)
Facility: CLINIC | Age: 39
Discharge: HOME | End: 2024-04-16
Attending: EMERGENCY MEDICINE | Admitting: EMERGENCY MEDICINE
Payer: COMMERCIAL

## 2024-04-16 VITALS
TEMPERATURE: 97.9 F | WEIGHT: 173.72 LBS | HEIGHT: 63 IN | RESPIRATION RATE: 18 BRPM | OXYGEN SATURATION: 100 % | DIASTOLIC BLOOD PRESSURE: 69 MMHG | SYSTOLIC BLOOD PRESSURE: 113 MMHG | HEART RATE: 88 BPM | BODY MASS INDEX: 30.78 KG/M2

## 2024-04-16 DIAGNOSIS — R20.2 PARESTHESIA: ICD-10-CM

## 2024-04-16 DIAGNOSIS — R42 DIZZINESS: ICD-10-CM

## 2024-04-16 DIAGNOSIS — R07.9 CHEST PAIN, UNSPECIFIED TYPE: ICD-10-CM

## 2024-04-16 LAB
ANION GAP SERPL CALCULATED.3IONS-SCNC: 10 MMOL/L (ref 7–15)
ATRIAL RATE - MUSE: 82 BPM
BASOPHILS # BLD AUTO: 0.1 10E3/UL (ref 0–0.2)
BASOPHILS NFR BLD AUTO: 1 %
BUN SERPL-MCNC: 21.4 MG/DL (ref 6–20)
CALCIUM SERPL-MCNC: 9.4 MG/DL (ref 8.6–10)
CHLORIDE SERPL-SCNC: 105 MMOL/L (ref 98–107)
CREAT SERPL-MCNC: 0.71 MG/DL (ref 0.51–0.95)
DEPRECATED HCO3 PLAS-SCNC: 25 MMOL/L (ref 22–29)
DIASTOLIC BLOOD PRESSURE - MUSE: NORMAL MMHG
EGFRCR SERPLBLD CKD-EPI 2021: >90 ML/MIN/1.73M2
EOSINOPHIL # BLD AUTO: 0.1 10E3/UL (ref 0–0.7)
EOSINOPHIL NFR BLD AUTO: 1 %
ERYTHROCYTE [DISTWIDTH] IN BLOOD BY AUTOMATED COUNT: 13 % (ref 10–15)
FLUAV RNA SPEC QL NAA+PROBE: NEGATIVE
FLUBV RNA RESP QL NAA+PROBE: NEGATIVE
GLUCOSE SERPL-MCNC: 138 MG/DL (ref 70–99)
HCT VFR BLD AUTO: 43.6 % (ref 35–47)
HGB BLD-MCNC: 14.3 G/DL (ref 11.7–15.7)
HOLD SPECIMEN: NORMAL
HOLD SPECIMEN: NORMAL
IMM GRANULOCYTES # BLD: 0 10E3/UL
IMM GRANULOCYTES NFR BLD: 0 %
INTERPRETATION ECG - MUSE: NORMAL
LYMPHOCYTES # BLD AUTO: 4.4 10E3/UL (ref 0.8–5.3)
LYMPHOCYTES NFR BLD AUTO: 36 %
MCH RBC QN AUTO: 29.7 PG (ref 26.5–33)
MCHC RBC AUTO-ENTMCNC: 32.8 G/DL (ref 31.5–36.5)
MCV RBC AUTO: 91 FL (ref 78–100)
MONOCYTES # BLD AUTO: 0.7 10E3/UL (ref 0–1.3)
MONOCYTES NFR BLD AUTO: 5 %
NEUTROPHILS # BLD AUTO: 7 10E3/UL (ref 1.6–8.3)
NEUTROPHILS NFR BLD AUTO: 57 %
NRBC # BLD AUTO: 0 10E3/UL
NRBC BLD AUTO-RTO: 0 /100
P AXIS - MUSE: 54 DEGREES
PLATELET # BLD AUTO: 335 10E3/UL (ref 150–450)
POTASSIUM SERPL-SCNC: 4.1 MMOL/L (ref 3.4–5.3)
PR INTERVAL - MUSE: 170 MS
QRS DURATION - MUSE: 114 MS
QT - MUSE: 388 MS
QTC - MUSE: 453 MS
R AXIS - MUSE: 77 DEGREES
RBC # BLD AUTO: 4.81 10E6/UL (ref 3.8–5.2)
RSV RNA SPEC NAA+PROBE: NEGATIVE
SARS-COV-2 RNA RESP QL NAA+PROBE: NEGATIVE
SODIUM SERPL-SCNC: 140 MMOL/L (ref 135–145)
SYSTOLIC BLOOD PRESSURE - MUSE: NORMAL MMHG
T AXIS - MUSE: 44 DEGREES
TROPONIN T SERPL HS-MCNC: <6 NG/L
VENTRICULAR RATE- MUSE: 82 BPM
WBC # BLD AUTO: 12.3 10E3/UL (ref 4–11)

## 2024-04-16 PROCEDURE — 36415 COLL VENOUS BLD VENIPUNCTURE: CPT | Performed by: EMERGENCY MEDICINE

## 2024-04-16 PROCEDURE — 85025 COMPLETE CBC W/AUTO DIFF WBC: CPT | Performed by: EMERGENCY MEDICINE

## 2024-04-16 PROCEDURE — 84484 ASSAY OF TROPONIN QUANT: CPT | Performed by: EMERGENCY MEDICINE

## 2024-04-16 PROCEDURE — 250N000013 HC RX MED GY IP 250 OP 250 PS 637: Performed by: EMERGENCY MEDICINE

## 2024-04-16 PROCEDURE — 70553 MRI BRAIN STEM W/O & W/DYE: CPT

## 2024-04-16 PROCEDURE — 80048 BASIC METABOLIC PNL TOTAL CA: CPT | Performed by: EMERGENCY MEDICINE

## 2024-04-16 PROCEDURE — A9585 GADOBUTROL INJECTION: HCPCS | Performed by: EMERGENCY MEDICINE

## 2024-04-16 PROCEDURE — 255N000002 HC RX 255 OP 636: Performed by: EMERGENCY MEDICINE

## 2024-04-16 PROCEDURE — 250N000011 HC RX IP 250 OP 636: Performed by: EMERGENCY MEDICINE

## 2024-04-16 PROCEDURE — 70546 MR ANGIOGRAPH HEAD W/O&W/DYE: CPT | Mod: XU

## 2024-04-16 PROCEDURE — 71046 X-RAY EXAM CHEST 2 VIEWS: CPT

## 2024-04-16 PROCEDURE — 70549 MR ANGIOGRAPH NECK W/O&W/DYE: CPT

## 2024-04-16 RX ORDER — ACETAMINOPHEN 325 MG/1
650 TABLET ORAL ONCE
Status: COMPLETED | OUTPATIENT
Start: 2024-04-16 | End: 2024-04-16

## 2024-04-16 RX ORDER — GADOBUTROL 604.72 MG/ML
0.1 INJECTION INTRAVENOUS ONCE
Status: COMPLETED | OUTPATIENT
Start: 2024-04-16 | End: 2024-04-16

## 2024-04-16 RX ORDER — MECLIZINE HYDROCHLORIDE 25 MG/1
25 TABLET ORAL ONCE
Status: COMPLETED | OUTPATIENT
Start: 2024-04-16 | End: 2024-04-16

## 2024-04-16 RX ORDER — LORAZEPAM 2 MG/ML
0.5 INJECTION INTRAMUSCULAR
Status: COMPLETED | OUTPATIENT
Start: 2024-04-16 | End: 2024-04-16

## 2024-04-16 RX ADMIN — LORAZEPAM 0.5 MG: 2 INJECTION INTRAMUSCULAR; INTRAVENOUS at 05:14

## 2024-04-16 RX ADMIN — MECLIZINE HYDROCHLORIDE 25 MG: 25 TABLET ORAL at 03:34

## 2024-04-16 RX ADMIN — ACETAMINOPHEN 650 MG: 325 TABLET, FILM COATED ORAL at 03:34

## 2024-04-16 RX ADMIN — GADOBUTROL 10 ML: 604.72 INJECTION INTRAVENOUS at 05:55

## 2024-04-16 ASSESSMENT — ACTIVITIES OF DAILY LIVING (ADL)
ADLS_ACUITY_SCORE: 33
ADLS_ACUITY_SCORE: 35
ADLS_ACUITY_SCORE: 33
ADLS_ACUITY_SCORE: 33
ADLS_ACUITY_SCORE: 35
ADLS_ACUITY_SCORE: 35

## 2024-04-16 NOTE — ED PROVIDER NOTES
"  History     Chief Complaint:  Chest Pain, Arm Pain, and Dizziness       HPI   Karely Munoz is a 38 year old female who presents with chest pain, arm pain and dizziness. She reports symptoms of  \"heavy, tight\" chest pain, left upper arm tingling, facial numbness, dizziness and headache. The headache began 3 days ago and has been taking Tylenol for it. The arm tingling and right sided facial numbness began yesterday. Patient describes the dizziness as if she was seasick.  Denies vision changes, speech changes, difficulty breathing, shortness of breath, nausea, recent illness, recent travel, or birth control use. No chance of pregnancy or history of blood history.     Review of External notes      Medications:    Wellbutrin   Cymbalta  Ativan     Past Medical History:    Depressive disorder  Fibromyalgia  Kidney stone  Nephrolithiasis  MARISA  Asthma     Past Surgical History:    Biopsy of skin lesion   Ear tubes - bilateral   Inguinal hernia repair  LEEP TX, cervical  Thomas teeth extraction      Physical Exam   Patient Vitals for the past 24 hrs:   BP Temp Temp src Pulse Resp SpO2 Height Weight   04/16/24 0820 113/69 -- -- 88 18 100 % -- --   04/16/24 0516 -- -- -- -- -- 97 % -- --   04/16/24 0515 -- -- -- -- -- 96 % -- --   04/16/24 0514 -- -- -- -- -- 98 % -- --   04/16/24 0513 -- -- -- -- -- 97 % -- --   04/16/24 0512 -- -- -- -- -- 97 % -- --   04/16/24 0511 -- -- -- -- -- 98 % -- --   04/16/24 0510 -- -- -- -- -- 96 % -- --   04/16/24 0509 102/67 -- -- 65 -- -- -- --   04/15/24 2338 (!) 146/83 97.9  F (36.6  C) Temporal 94 18 98 % 1.6 m (5' 3\") 78.8 kg (173 lb 11.6 oz)      Physical Exam  Gen: alert  Neck: normal ROM  CV: RRR, 2+ distal pulses in all 4 extremities  Chest: no tenderness over the chest wall  Pulm: breath sounds equal, lungs clear  Abd: Soft, nontender  Back: no evidence of injury  MSK: no lower extremity edema, no calf tenderness  Skin: no rash  Neuro: alert, appropriate conversation and " interaction   speech fluent, PERRL, EOMI, CN 2-7 and 9-12 intact, 5/5 grasp BUE, 5/5 elbow flexion and extension BUE, 5/5 shoulder abduction BUE, 5/5 hip flexion, knee flexion, knee extension, plantar and dorsiflexion BLE, no pronator drift, normal gait, negative romberg, no dysdiadochokinesia, normal finger-nose-finger testing     Emergency Department Course   ECG:  ECG results from 04/16/24   EKG 12-lead, tracing only     Value    Systolic Blood Pressure     Diastolic Blood Pressure     Ventricular Rate 82    Atrial Rate 82    [place] Interval 170    QRS Duration 114        QTc 453    P Axis 54    R AXIS 77    T Axis 44    Interpretation ECG      Sinus rhythm  Incomplete right bundle branch block  Septal infarct , age undetermined  Abnormal ECG  When compared with ECG of 12-APR-2021 12:27,  Nonspecific T wave abnormality now evident in Anterior leads  Read at 0304       Imaging:  MR Brain w/o & w Contrast   Final Result   IMPRESSION:   HEAD MRI:   Normal head MRI.      HEAD MRA:   Normal MRA [place].      NECK MRA:   No measurable stenosis or dissection.         MRA Brain ([place]) wo & w Contrast   Final Result   IMPRESSION:   HEAD MRI:   Normal head MRI.      HEAD MRA:   Normal MRA [place].      NECK MRA:   No measurable stenosis or dissection.         MRA Neck ([place]) wo & w Contrast   Final Result   IMPRESSION:   HEAD MRI:   Normal head MRI.      HEAD MRA:   Normal MRA [place].      NECK MRA:   No measurable stenosis or dissection.         Chest XR,  PA & LAT   Final Result   IMPRESSION:   Hyperinflation versus deep inspiration changes. Clinical correlation for air trapping. Heart size and pulmonary vascularity within normal limits. No focal lung infiltrates. Osseous structures grossly intact. Visualized upper abdomen    unremarkable.         Report per radiology    Laboratory:  Labs Ordered and Resulted from Time of ED Arrival to Time of ED Departure   BASIC METABOLIC  PANEL - Abnormal       Result Value    Sodium 140      Potassium 4.1      Chloride 105      Carbon Dioxide (CO2) 25      Anion Gap 10      Urea Nitrogen 21.4 (*)     Creatinine 0.71      GFR Estimate >90      Calcium 9.4      Glucose 138 (*)    CBC WITH PLATELETS AND DIFFERENTIAL - Abnormal    WBC Count 12.3 (*)     RBC Count 4.81      Hemoglobin 14.3      Hematocrit 43.6      MCV 91      MCH 29.7      MCHC 32.8      RDW 13.0      Platelet Count 335      % Neutrophils 57      % Lymphocytes 36      % Monocytes 5      % Eosinophils 1      % Basophils 1      % Immature Granulocytes 0      NRBCs per 100 WBC 0      Absolute Neutrophils 7.0      Absolute Lymphocytes 4.4      Absolute Monocytes 0.7      Absolute Eosinophils 0.1      Absolute Basophils 0.1      Absolute Immature Granulocytes 0.0      Absolute NRBCs 0.0     INFLUENZA A/B, RSV, & SARS-COV2 PCR - Normal    Influenza A PCR Negative      Influenza B PCR Negative      RSV PCR Negative      SARS CoV2 PCR Negative     TROPONIN T, HIGH SENSITIVITY - Normal    Troponin T, High Sensitivity <6        ED Course as of 04/16/24 1455   Tue Apr 16, 2024   0258 I obtained history and examined the patient as noted above.        Interventions:  Medications   acetaminophen (TYLENOL) tablet 650 mg (650 mg Oral $Given 4/16/24 0334)   meclizine (ANTIVERT) tablet 25 mg (25 mg Oral $Given 4/16/24 0334)   LORazepam (ATIVAN) injection 0.5 mg (0.5 mg Intravenous $Given 4/16/24 0514)   gadobutrol (GADAVIST) injection 7.88 mL (10 mLs Intravenous $Given 4/16/24 0555)      Impression & Plan    Independent Interpretation:  See ED course    Medical Decision Making:  The patient presents for chest pain.  An evaluation for life threatening cause of chest pain was undertaken.  Ddx included ACS, unstable angina, PTX, pneumonia, aortic dissection, PE.  The history and presenting symptoms do not suggest unstable angina.  EKG is nonischemic and troponin is negative.  CXR was negative for evidence  of PTX or PNA.  Symptoms are not suggestive of infectious process.  Mediastinum is not widened on CXR and symptoms and history is not suggestive of aortic dissection.  Presentation not suggestive of PE based on risk factors and vital signs.  Low risk for PE and PERC neg.  Given headache, dizziness and parestheisa, considered acute intracrnaila pathology.  MRI neg for mass, ICH, aneurysm.  MRA neg for occulsion, stenosis, dissection or other acute process.  Clinically doubt MRI.  I feel that ED evaluation has excluded acute, dangerous cause of CP, headache and dizziness and patient is safe for discharge.  Discussed signs and symptoms for which to return and the need for close outpatient follow up.      Disposition:  Discharge    Diagnosis:    ICD-10-CM    1. Chest pain, unspecified type  R07.9       2. Dizziness  R42       3. Paresthesia  R20.2            Discharge Medications:  Discharge Medication List as of 4/16/2024  8:16 AM         Scribe Disclosure:  IMaureen, am serving as a scribe at 3:16 AM on 4/16/2024 to document services personally performed by Ira Townsend based on my observations and the provider's statements to me.  Ira Townsend  April 16, 2024     Ira Townsend MD  04/16/24 7814

## 2024-04-16 NOTE — ED NOTES
"Pt giving pt discharge instructions to pt, pt verbally aggressive with RN. Pt stating, \"I fucking want to get the fuck out of here, you guys did nothing for me, you are sending me home when you didn't find anything wrong, fuck this place.\" Writer offered to have the MD come back In prior to discharge home and pt refused stating, \"I want to get the fuck out of here,No.\" Pt discharged home with paperwork.   "

## 2024-04-16 NOTE — ED TRIAGE NOTES
Presents to triage with multiple symptoms. Patient states that she has had headache and been feeling generalized weakness for the past several days. This morning she began feeling chest tightness, L arm pain, tingling in the R side of the face, and intermittent dizziness. BEFAST negative, no cardiac hx.

## 2024-06-18 ENCOUNTER — TELEPHONE (OUTPATIENT)
Dept: FAMILY MEDICINE | Facility: CLINIC | Age: 39
End: 2024-06-18
Payer: COMMERCIAL

## 2024-06-18 NOTE — TELEPHONE ENCOUNTER
Patient Quality Outreach    Patient is due for the following:   Asthma  -  ACT needed  Physical Well Child Check    Next Steps:   Schedule a Adult Preventative    Type of outreach:    Sent [employer] message.      Questions for provider review:    None           Niya Garner CMA

## 2024-10-07 SDOH — HEALTH STABILITY: PHYSICAL HEALTH: ON AVERAGE, HOW MANY MINUTES DO YOU ENGAGE IN EXERCISE AT THIS LEVEL?: 60 MIN

## 2024-10-07 SDOH — HEALTH STABILITY: PHYSICAL HEALTH: ON AVERAGE, HOW MANY DAYS PER WEEK DO YOU ENGAGE IN MODERATE TO STRENUOUS EXERCISE (LIKE A BRISK WALK)?: 5 DAYS

## 2024-10-07 ASSESSMENT — SOCIAL DETERMINANTS OF HEALTH (SDOH): HOW OFTEN DO YOU GET TOGETHER WITH FRIENDS OR RELATIVES?: ONCE A WEEK

## 2024-10-07 ASSESSMENT — ASTHMA QUESTIONNAIRES
ACT_TOTALSCORE: 22
QUESTION_4 LAST FOUR WEEKS HOW OFTEN HAVE YOU USED YOUR RESCUE INHALER OR NEBULIZER MEDICATION (SUCH AS ALBUTEROL): ONCE A WEEK OR LESS
QUESTION_2 LAST FOUR WEEKS HOW OFTEN HAVE YOU HAD SHORTNESS OF BREATH: ONCE OR TWICE A WEEK
QUESTION_3 LAST FOUR WEEKS HOW OFTEN DID YOUR ASTHMA SYMPTOMS (WHEEZING, COUGHING, SHORTNESS OF BREATH, CHEST TIGHTNESS OR PAIN) WAKE YOU UP AT NIGHT OR EARLIER THAN USUAL IN THE MORNING: NOT AT ALL
QUESTION_1 LAST FOUR WEEKS HOW MUCH OF THE TIME DID YOUR ASTHMA KEEP YOU FROM GETTING AS MUCH DONE AT WORK, SCHOOL OR AT HOME: NONE OF THE TIME
ACT_TOTALSCORE: 22
QUESTION_5 LAST FOUR WEEKS HOW WOULD YOU RATE YOUR ASTHMA CONTROL: WELL CONTROLLED

## 2024-10-08 ENCOUNTER — OFFICE VISIT (OUTPATIENT)
Dept: FAMILY MEDICINE | Facility: CLINIC | Age: 39
End: 2024-10-08
Payer: COMMERCIAL

## 2024-10-08 VITALS
TEMPERATURE: 98.3 F | OXYGEN SATURATION: 97 % | DIASTOLIC BLOOD PRESSURE: 64 MMHG | HEART RATE: 84 BPM | WEIGHT: 176 LBS | SYSTOLIC BLOOD PRESSURE: 92 MMHG | BODY MASS INDEX: 31.18 KG/M2 | HEIGHT: 63 IN | RESPIRATION RATE: 16 BRPM

## 2024-10-08 DIAGNOSIS — Z00.00 ANNUAL PHYSICAL EXAM: Primary | ICD-10-CM

## 2024-10-08 DIAGNOSIS — Z80.3 FAMILY HISTORY OF MALIGNANT NEOPLASM OF BREAST: ICD-10-CM

## 2024-10-08 DIAGNOSIS — J34.9 NOSE SYMPTOM: ICD-10-CM

## 2024-10-08 DIAGNOSIS — E66.811 CLASS 1 OBESITY WITHOUT SERIOUS COMORBIDITY WITH BODY MASS INDEX (BMI) OF 31.0 TO 31.9 IN ADULT, UNSPECIFIED OBESITY TYPE: ICD-10-CM

## 2024-10-08 LAB
ALBUMIN SERPL BCG-MCNC: 4.4 G/DL (ref 3.5–5.2)
ALP SERPL-CCNC: 66 U/L (ref 40–150)
ALT SERPL W P-5'-P-CCNC: 12 U/L (ref 0–50)
AMPHETAMINES UR QL SCN: NORMAL
AST SERPL W P-5'-P-CCNC: 17 U/L (ref 0–45)
BARBITURATES UR QL SCN: NORMAL
BENZODIAZ UR QL SCN: NORMAL
BILIRUB DIRECT SERPL-MCNC: <0.2 MG/DL (ref 0–0.3)
BILIRUB SERPL-MCNC: 0.4 MG/DL
BZE UR QL SCN: NORMAL
CANNABINOIDS UR QL SCN: NORMAL
EST. AVERAGE GLUCOSE BLD GHB EST-MCNC: 105 MG/DL
FENTANYL UR QL: NORMAL
HBA1C MFR BLD: 5.3 % (ref 0–5.6)
OPIATES UR QL SCN: NORMAL
PCP QUAL URINE (ROCHE): NORMAL
PROT SERPL-MCNC: 7.4 G/DL (ref 6.4–8.3)
TSH SERPL DL<=0.005 MIU/L-ACNC: 2.23 UIU/ML (ref 0.3–4.2)

## 2024-10-08 PROCEDURE — 84443 ASSAY THYROID STIM HORMONE: CPT | Performed by: FAMILY MEDICINE

## 2024-10-08 PROCEDURE — 80307 DRUG TEST PRSMV CHEM ANLYZR: CPT | Performed by: FAMILY MEDICINE

## 2024-10-08 PROCEDURE — 80076 HEPATIC FUNCTION PANEL: CPT | Performed by: FAMILY MEDICINE

## 2024-10-08 PROCEDURE — 83036 HEMOGLOBIN GLYCOSYLATED A1C: CPT | Performed by: FAMILY MEDICINE

## 2024-10-08 PROCEDURE — 36415 COLL VENOUS BLD VENIPUNCTURE: CPT | Performed by: FAMILY MEDICINE

## 2024-10-08 PROCEDURE — 99213 OFFICE O/P EST LOW 20 MIN: CPT | Mod: 25 | Performed by: FAMILY MEDICINE

## 2024-10-08 PROCEDURE — 99395 PREV VISIT EST AGE 18-39: CPT | Mod: 25 | Performed by: FAMILY MEDICINE

## 2024-10-08 NOTE — PROGRESS NOTES
"Preventive Care Visit  [facility]  Eric Jones MD, Family Medicine  Oct 8, 2024      Assessment & Plan     Annual physical exam  - Hemoglobin A1c  - Hepatic panel (Albumin, ALT, AST, Bili, Alk Phos, TP)  - Urine Drug Screen  - Hemoglobin A1c  - Hepatic panel (Albumin, ALT, AST, Bili, Alk Phos, TP)  - Urine Drug Screen    Class 1 obesity without serious comorbidity with body mass index (BMI) of 31.0 to 31.9 in adult, unspecified obesity type  Pending insurance coverage.  If GLP-1 was not covered, I did discuss starting phentermine however it may provoke anxiety.  If considering doing this recommend starting at the lowest dose.  Screen.  - tirzepatide-Weight Management (ZEPBOUND) 2.5 MG/0.5ML prefilled pen  Dispense: 0.5 mL; Refill: 0  - TSH with free T4 reflex  - Hemoglobin A1c  - TSH with free T4 reflex  - Hemoglobin A1c    Nose symptom  Patient has a nasal septal deviation with intermittent sinusitis, she would like ENT's input.  Referral placed.  - Adult ENT  Referral    Family history of malignant neoplasm of breast  - MA Screen Bilateral w/Kade              Nicotine/Tobacco Cessation  She reports that she has been smoking cigarettes. She has a 1.3 pack-year smoking history. She has never used smokeless tobacco.  Nicotine/Tobacco Cessation Plan  Information offered: Patient not interested at this time      BMI  Estimated body mass index is 31.18 kg/m  as calculated from the following:    Height as of this encounter: 1.6 m (5' 3\").    Weight as of this encounter: 79.8 kg (176 lb).   Weight management plan: Discussed healthy diet and exercise guidelines    Counseling  Appropriate preventive services were addressed with this patient via screening, questionnaire, or discussion as appropriate for fall prevention, nutrition, physical activity, Tobacco-use cessation, social engagement, weight loss and cognition.  Checklist reviewing preventive services available has been given to the " patient.  Reviewed patient's diet, addressing concerns and/or questions.           Subjective   Karely is a 38 year old, presenting for the following:  Physical        10/8/2024    12:40 PM   Additional Questions   Roomed by Vibha CUELLAR        Health Care Directive  Patient does not have a Health Care Directive or Living Will: Discussed advance care planning with patient; however, patient declined at this time.    HPI    Patient would like to discuss medical weight management, would like to see ENT for her septal sinus deviation and she would like to discuss ongoing stressors.      10/7/2024   General Health   How would you rate your overall physical health? (!) FAIR   Feel stress (tense, anxious, or unable to sleep) Rather much      (!) STRESS CONCERN      10/7/2024   Nutrition   Three or more servings of calcium each day? Yes   Diet: Regular (no restrictions)   How many servings of fruit and vegetables per day? 4 or more   How many sweetened beverages each day? 0-1            10/7/2024   Exercise   Days per week of moderate/strenous exercise 5 days   Average minutes spent exercising at this level 60 min            10/7/2024   Social Factors   Frequency of gathering with friends or relatives Once a week   Worry food won't last until get money to buy more No   Food not last or not have enough money for food? No   Do you have housing? (Housing is defined as stable permanent housing and does not include staying ouside in a car, in a tent, in an abandoned building, in an overnight shelter, or couch-surfing.) Yes   Are you worried about losing your housing? No   Lack of transportation? No   Unable to get utilities (heat,electricity)? No            10/7/2024   Dental   Dentist two times every year? Yes            10/7/2024   TB Screening   Were you born outside of the [place]? No            Today's PHQ-2 Score:       10/7/2024     1:32 PM   PHQ-2 ( 1999 [employer])   Q1: Little interest or pleasure in doing things 0   Q2: Feeling  "down, depressed or hopeless 0   PHQ-2 Score 0   Q1: Little interest or pleasure in doing things Not at all   Q2: Feeling down, depressed or hopeless Not at all   PHQ-2 Score 0           10/7/2024   Substance Use   Alcohol more than 3/day or more than 7/wk No   Do you use any other substances recreationally? No        Social History     Tobacco Use    Smoking status: Every Day     Current packs/day: 0.25     Average packs/day: 0.3 packs/day for 5.0 years (1.3 ttl pk-yrs)     Types: Cigarettes    Smokeless tobacco: Never   Vaping Use    Vaping status: Never Used   Substance Use Topics    Alcohol use: Not Currently    Drug use: No           7/11/2022   LAST FHS-7 RESULTS   1st degree relative breast or ovarian cancer Yes   Any relative bilateral breast cancer Yes   Any male have breast cancer No   Any ONE woman have BOTH breast AND ovarian cancer No   Any woman with breast cancer before 50yrs Yes   2 or more relatives with breast AND/OR ovarian cancer No   2 or more relatives with breast AND/OR bowel cancer No                   10/7/2024   STI Screening   New sexual partner(s) since last STI/HIV test? No        History of abnormal Pap smear: No - age 30- 64 PAP with HPV every 5 years recommended        Latest Ref Rng & Units 6/8/2022     3:36 PM 5/9/2019    12:00 AM   PAP / HPV   PAP  Negative for Intraepithelial Lesion or Malignancy (NILM)     PAP (Historical)   NIL, neg HPV       HPV 16 DNA Negative Negative     HPV 18 DNA Negative Negative     Other HR HPV Negative Negative         This result is from an external source.           10/7/2024   Contraception/Family Planning   Questions about contraception or family planning No           Reviewed and updated as needed this visit by Provider                             Objective    Exam  BP 92/64 (Cuff Size: Adult Regular)   Pulse 84   Temp 98.3  F (36.8  C) (Oral)   Resp 16   Ht 1.6 m (5' 3\")   Wt 79.8 kg (176 lb)   LMP 10/02/2024 (Exact Date)   SpO2 97%   BMI " "31.18 kg/m     Estimated body mass index is 31.18 kg/m  as calculated from the following:    Height as of this encounter: 1.6 m (5' 3\").    Weight as of this encounter: 79.8 kg (176 lb).    Physical Exam  Vitals reviewed.   Constitutional:       General: She is not in acute distress.     Appearance: Normal appearance. She is not ill-appearing or diaphoretic.   HENT:      Head: Normocephalic and atraumatic.      Right Ear: Tympanic membrane normal.      Left Ear: Tympanic membrane normal.      Nose: Nose normal. No congestion or rhinorrhea.      Mouth/Throat:      Mouth: Mucous membranes are moist.      Pharynx: Oropharynx is clear.   Eyes:      General: No scleral icterus.        Right eye: No discharge.         Left eye: No discharge.      Extraocular Movements: Extraocular movements intact.      Pupils: Pupils are equal, round, and reactive to light.   Cardiovascular:      Rate and Rhythm: Normal rate and regular rhythm.      Heart sounds: No murmur heard.  Pulmonary:      Effort: Pulmonary effort is normal.      Breath sounds: Normal breath sounds.   Abdominal:      General: Abdomen is flat. There is no distension.   Musculoskeletal:         General: No swelling.      Cervical back: Normal range of motion.   Lymphadenopathy:      Cervical: No cervical adenopathy.   Skin:     General: Skin is warm.      Capillary Refill: Capillary refill takes less than 2 seconds.   Neurological:      General: No focal deficit present.      Mental Status: She is alert.   Psychiatric:         Mood and Affect: Mood normal.         Behavior: Behavior normal.         Thought Content: Thought content normal.         Judgment: Judgment normal.               Signed Electronically by: Eric Jones MD    "

## 2024-10-09 ENCOUNTER — PATIENT OUTREACH (OUTPATIENT)
Dept: CARE COORDINATION | Facility: CLINIC | Age: 39
End: 2024-10-09
Payer: COMMERCIAL

## 2024-10-15 ENCOUNTER — MYC MEDICAL ADVICE (OUTPATIENT)
Dept: FAMILY MEDICINE | Facility: CLINIC | Age: 39
End: 2024-10-15
Payer: COMMERCIAL

## 2024-10-16 DIAGNOSIS — E66.811 CLASS 1 OBESITY WITHOUT SERIOUS COMORBIDITY WITH BODY MASS INDEX (BMI) OF 31.0 TO 31.9 IN ADULT, UNSPECIFIED OBESITY TYPE: ICD-10-CM

## 2024-10-16 NOTE — TELEPHONE ENCOUNTER
Pharmacy states that the medication has incorrect pkg size. Requesting a new Rx with the correct pkg size.     Order Information    Ordered Status Priority Ordering User Department   10/08/24 Sent (none) Eric Jones MD [facility]     Order History  Outpatient  Date/Time Action Taken User Additional Information   10/08/24 1313 Sign Eric Jones MD      Outpatient Medication Detail     Disp Refills Start End GALINA   tirzepatide-Weight Management (ZEPBOUND) 2.5 MG/0.5ML prefilled pen 0.5 mL 0 10/8/2024 -- No   Sig - Route: Inject 0.5 mLs (2.5 mg) subcutaneously every 7 days. - Subcutaneous   Sent to pharmacy as: Tirzepatide-Weight Management 2.5 MG/0.5ML Subcutaneous Solution Auto-injector (ZEPBOUND)   Class: E-Prescribe   Order: [id number]   E-Prescribing Status: Receipt confirmed by pharmacy (10/8/2024  7:54 PM CDT)   Prior authorization: Approved     Printout Tracking    External Result Report     Pharmacy    [id number] - [place], [place] - [address] AT [facility] [address]     Associated Diagnoses    Class 1 obesity without serious comorbidity with body mass index (BMI) of 31.0 to 31.9 in adult, unspecified obesity type [E66.811, Z68.31]

## 2024-11-15 ENCOUNTER — ANCILLARY PROCEDURE (OUTPATIENT)
Dept: MAMMOGRAPHY | Facility: CLINIC | Age: 39
End: 2024-11-15
Attending: FAMILY MEDICINE
Payer: COMMERCIAL

## 2024-11-15 DIAGNOSIS — Z80.3 FAMILY HISTORY OF MALIGNANT NEOPLASM OF BREAST: ICD-10-CM

## 2024-11-15 PROCEDURE — 77063 BREAST TOMOSYNTHESIS BI: CPT | Mod: TC | Performed by: RADIOLOGY

## 2024-11-15 PROCEDURE — 77067 SCR MAMMO BI INCL CAD: CPT | Mod: TC | Performed by: RADIOLOGY

## 2024-12-02 NOTE — TELEPHONE ENCOUNTER
"FUTURE VISIT INFORMATION      FUTURE VISIT INFORMATION:  Date: 1/27/25  Time: 4:30pm  [place] [facility] [place]ERR[place] INFORMATION:  Referring provider:  Eric Jones MD  Referring providers clinic:  [facility]   Reason for visit/diagnosis  DX J34.9 (ICD-10-CM) - Nose symptom, patient concerned for deviated septum  REF'D by Eric Jones MD  SCHEDULED W/ PT  [facility] verified     RECORDS REQUESTED FROM:       Clinic name Comments Records Status Imaging Status   [facility]  10/8/24- OV wEric Collins MD Epic     Imaging  4/16/24- MRA Neck + MRA Brain + MR Brain  [facility]  Pacs            \"Please notify/message CSS if patient completed outside imaging prior to scheduled appointment and/or any outside records that might have been missed at pre visit -Thanks\"  "

## 2024-12-09 DIAGNOSIS — E66.811 CLASS 1 OBESITY WITHOUT SERIOUS COMORBIDITY WITH BODY MASS INDEX (BMI) OF 31.0 TO 31.9 IN ADULT, UNSPECIFIED OBESITY TYPE: ICD-10-CM

## 2024-12-09 RX ORDER — TIRZEPATIDE 2.5 MG/.5ML
INJECTION, SOLUTION SUBCUTANEOUS
Qty: 2 ML | Refills: 1 | Status: SHIPPED | OUTPATIENT
Start: 2024-12-09

## 2025-01-27 ENCOUNTER — PRE VISIT (OUTPATIENT)
Dept: OTOLARYNGOLOGY | Facility: CLINIC | Age: 40
End: 2025-01-27

## 2025-01-27 ENCOUNTER — OFFICE VISIT (OUTPATIENT)
Dept: OTOLARYNGOLOGY | Facility: CLINIC | Age: 40
End: 2025-01-27
Payer: COMMERCIAL

## 2025-01-27 VITALS
SYSTOLIC BLOOD PRESSURE: 119 MMHG | HEART RATE: 86 BPM | DIASTOLIC BLOOD PRESSURE: 78 MMHG | HEIGHT: 63 IN | BODY MASS INDEX: 28.12 KG/M2 | OXYGEN SATURATION: 97 % | WEIGHT: 158.7 LBS

## 2025-01-27 DIAGNOSIS — J34.9 NOSE SYMPTOM: ICD-10-CM

## 2025-01-27 PROCEDURE — 99202 OFFICE O/P NEW SF 15 MIN: CPT | Performed by: OTOLARYNGOLOGY

## 2025-01-27 ASSESSMENT — PAIN SCALES - GENERAL: PAINLEVEL_OUTOF10: NO PAIN (0)

## 2025-01-27 NOTE — NURSING NOTE
"Chief Complaint   Patient presents with    Consult   Blood pressure 119/78, pulse 86, height 1.6 m (5' 3\"), weight 72 kg (158 lb 11.2 oz), SpO2 97%, not currently breastfeeding. Ajit Ledezma, EMT    "

## 2025-01-27 NOTE — PROGRESS NOTES
"  [facility] AND THRO[facility] CLIN[facility] [address] [address]  Phone: [phone]  Fax: [phone]    Patient:  Karely Munoz, Date of birth 1985  Date of Visit:  01/26/2025  Referring Provider Eric Jones      Assessment & Plan      (J34.9) Nose symptom, obstruction, nasal valve deformity  Comment: we discussed limited approach with septoturbinoplasty verses second opinion with facial plastics colleague to consider addressing nasal valve issue.   Plan: I recommend further evaluation as I am concerned a limited approach with not correct the valve narrowing or collapse. She is amenable. Strongly encouraged smoking cessation.    20 minutes spent by me on the date of the encounter doing chart review, history and exam, documentation and further activities per the note       Lenka Nick MD       Otolaryngology Adult Consultation    HPI: Karely Munoz is a 39 year old female seen today in the Otolaryngology Clinic in consultation from Eric Jones for a history of chronic nasal obstruction. She says she is a \"lifetime mouth breather.\" No prior injury or surgery. Mild allergies, didn't think nose sprays were helpful. Not sure if her nasal obstruction affects her sleep. No other ENT issues. She does smoke and is interested in quitting. Both sides of her nose are congested.     Current Outpatient Medications   Medication Sig Dispense Refill    acetaminophen-codeine (TYLENOL #3) 300-30 MG per tablet Take 1 tablet by mouth every 6 hours as needed for severe pain 30 tablet 0    LORazepam (ATIVAN) 0.5 MG tablet Take 1 tablet (0.5 mg) by mouth every 6 hours as needed for anxiety 30 tablet 0    ZEPBOUND 2.5 MG/0.5ML prefilled pen ADMINISTER 2.5 MG UNDER THE SKIN EVERY 7 DAYS 2 mL 1     No current facility-administered medications for this visit.        No Known Allergies    Past Medical History:   Diagnosis Date    Depressive disorder     depression/anxiety    " Fibromyalgia     Kidney stone     Positive fetal fibronectin at 22 weeks to 34 weeks gestation 10/31/2019       Social History     Occupational History    Not on file   Tobacco Use    Smoking status: Every Day     Current packs/day: 0.25     Average packs/day: 0.3 packs/day for 5.0 years (1.3 ttl pk-yrs)     Types: Cigarettes    Smokeless tobacco: Never   Vaping Use    Vaping status: Never Used   Substance and Sexual Activity    Alcohol use: Not Currently    Drug use: No    Sexual activity: Yes     Partners: Male     Birth control/protection: Condom        Review of Systems       No data to display                 14 point ROS neg other than the symptoms noted above.    Physical Exam:    General Assessment   The patient is alert, oriented and in no acute distress.   Head/Face/Scalp  Grossly normal.     Nose  External nose is deviated to R, dorsum shows deficiency along left lower lateral area/nasal valve. Intranasal exam (anterior rhinoscopy) reveals normal moist mucosa, right inferior turbinate is large. Septum deflected to left, obstructing anterior airway. Caudal cartilage is cup shaped and very firm, protrudes into R nasal passage. Valve collapse on R. Static narrowing on left.

## 2025-01-27 NOTE — LETTER
"1/27/2025       RE: Karely Munoz  [address] [place] [address]     Dear Colleague,    Thank you for referring your patient, Karely Munoz, to the [facility] NO[facility] THROAT CLIN[facility] [place] at [facility]. Please see a copy of my visit note below.      [facility]AT CLIN[facility] [address]  4TH FLOOR  [facility] [address]  Phone: [phone]  Fax: [phone]    Patient:  Karely Munoz, Date of birth 1985  Date of Visit:  01/26/2025  Referring Provider Eric Jones      Assessment & Plan     (J34.9) Nose symptom, obstruction, nasal valve deformity  Comment: we discussed limited approach with septoturbinoplasty verses second opinion with facial plastics colleague to consider addressing nasal valve issue.   Plan: I recommend further evaluation as I am concerned a limited approach with not correct the valve narrowing or collapse. She is amenable. Strongly encouraged smoking cessation.    20 minutes spent by me on the date of the encounter doing chart review, history and exam, documentation and further activities per the note       Lenka Nick MD       Otolaryngology Adult Consultation    HPI: Karely Munoz is a 39 year old female seen today in the Otolaryngology Clinic in consultation from Eric Jones for a history of chronic nasal obstruction. She says she is a \"lifetime mouth breather.\" No prior injury or surgery. Mild allergies, didn't think nose sprays were helpful. Not sure if her nasal obstruction affects her sleep. No other ENT issues. She does smoke and is interested in quitting. Both sides of her nose are congested.     Current Outpatient Medications   Medication Sig Dispense Refill     acetaminophen-codeine (TYLENOL #3) 300-30 MG per tablet Take 1 tablet by mouth every 6 hours as needed for severe pain 30 tablet 0     LORazepam (ATIVAN) 0.5 MG tablet Take 1 tablet (0.5 mg) by " mouth every 6 hours as needed for anxiety 30 tablet 0     ZEPBOUND 2.5 MG/0.5ML prefilled pen ADMINISTER 2.5 MG UNDER THE SKIN EVERY 7 DAYS 2 mL 1     No current facility-administered medications for this visit.        No Known Allergies    Past Medical History:   Diagnosis Date     Depressive disorder     depression/anxiety     Fibromyalgia      Kidney stone      Positive fetal fibronectin at 22 weeks to 34 weeks gestation 10/31/2019       Social History     Occupational History     Not on file   Tobacco Use     Smoking status: Every Day     Current packs/day: 0.25     Average packs/day: 0.3 packs/day for 5.0 years (1.3 ttl pk-yrs)     Types: Cigarettes     Smokeless tobacco: Never   Vaping Use     Vaping status: Never Used   Substance and Sexual Activity     Alcohol use: Not Currently     Drug use: No     Sexual activity: Yes     Partners: Male     Birth control/protection: Condom        Review of Systems       No data to display                 14 point ROS neg other than the symptoms noted above.    Physical Exam:    General Assessment   The patient is alert, oriented and in no acute distress.   Head/Face/Scalp  Grossly normal.     Nose  External nose is deviated to R, dorsum shows deficiency along left lower lateral area/nasal valve. Intranasal exam (anterior rhinoscopy) reveals normal moist mucosa, right inferior turbinate is large. Septum deflected to left, obstructing anterior airway. Caudal cartilage is cup shaped and very firm, protrudes into R nasal passage. Valve collapse on R. Static narrowing on left.                     Again, thank you for allowing me to participate in the care of your patient.      Sincerely,    Lenka Nick MD

## 2025-01-29 ENCOUNTER — MYC MEDICAL ADVICE (OUTPATIENT)
Dept: FAMILY MEDICINE | Facility: CLINIC | Age: 40
End: 2025-01-29
Payer: COMMERCIAL

## 2025-01-29 DIAGNOSIS — E66.811 CLASS 1 OBESITY WITHOUT SERIOUS COMORBIDITY WITH BODY MASS INDEX (BMI) OF 31.0 TO 31.9 IN ADULT, UNSPECIFIED OBESITY TYPE: ICD-10-CM

## 2025-01-30 RX ORDER — TIRZEPATIDE 2.5 MG/.5ML
INJECTION, SOLUTION SUBCUTANEOUS
Qty: 2 ML | Refills: 1 | OUTPATIENT
Start: 2025-01-30

## 2025-03-05 ENCOUNTER — MYC REFILL (OUTPATIENT)
Dept: FAMILY MEDICINE | Facility: CLINIC | Age: 40
End: 2025-03-05
Payer: COMMERCIAL

## 2025-03-05 DIAGNOSIS — E66.811 CLASS 1 OBESITY WITHOUT SERIOUS COMORBIDITY WITH BODY MASS INDEX (BMI) OF 31.0 TO 31.9 IN ADULT, UNSPECIFIED OBESITY TYPE: ICD-10-CM

## 2025-03-06 NOTE — TELEPHONE ENCOUNTER
Clinic RN: Please investigate patient's chart or contact patient if the information cannot be found because  patient already has a staged order for the next increase in dosing. Please confirm which dose the patient requires now. Thanks.    Herbert Angela, RN, BSN, MSN  RN Lead

## 2025-03-07 ENCOUNTER — TELEPHONE (OUTPATIENT)
Dept: OTOLARYNGOLOGY | Facility: CLINIC | Age: 40
End: 2025-03-07
Payer: COMMERCIAL

## 2025-03-07 NOTE — TELEPHONE ENCOUNTER
[facility] Call Center    Phone Message    May a detailed message be left on voicemail: yes     Reason for Call: Other: Per pt she is looking for directions for follow up care Pt is very up set that she hasn't heard from us in 6 weeks. Please call to discuss. Thank you     Action Taken: Message routed to:  [facility] ([facility] ENT    Travel Screening: Not Applicable     Date of Service:

## 2025-03-10 NOTE — TELEPHONE ENCOUNTER
Left Voicemail (1st Attempt) for the patient to call back and schedule the following:    Appointment Type: New Rhinology  Provider: Dr. Bernie White and Dr. Sybil Knapp   Appt date: next open  Specialty phone number: [phone]   Additional appointment(s) needed:   Additional Notes: nasal valve collapse - Referred by Dr Nick

## 2025-05-05 ENCOUNTER — TELEPHONE (OUTPATIENT)
Dept: FAMILY MEDICINE | Facility: CLINIC | Age: 40
End: 2025-05-05
Payer: COMMERCIAL

## 2025-05-05 NOTE — TELEPHONE ENCOUNTER
Prior Authorization Retail Medication Request    Medication/Dose: tirzepatide-Weight Management (ZEPBOUND) 5 MG/0.5ML prefilled pen  Diagnosis and ICD code (if different than what is on RX):    New/renewal/insurance change PA/secondary ins. PA:  Previously Tried and Failed:    Rationale:      Insurance   Primary: [employer]  Insurance ID:  [id number]    Secondary (if applicable):  Insurance ID:      Pharmacy Information (if different than what is on RX)  Name:    Phone:    Fax:    Clinic Information  Preferred routing pool for dept communication:     Martinez COOK

## 2025-05-08 NOTE — TELEPHONE ENCOUNTER
Note: Due to record-high volumes, our turn-around time is taking longer than usual . We are currently 3  business days behind in the pools.   We are working diligently to submit all requests in a timely manner and in the order they are received. Please only flag TRUE URGENT requests as high priority to the pool at this time.   If you have questions on status of PA's,  please send a note/message in the active PA encounter and send back to the [facility] PA pool [[id number]].    If you have questions about the turn-around time or about our process, please reach out to our supervisor Shelbie Serna.   Thank you!   RPPA (Retail Pharmacy Prior Authorization) team      Central Prior Authorization Team   Phone: [phone]    PA Initiation    Medication: tirzepatide-Weight Management (ZEPBOUND) 5 MG/0.5ML prefilled pen  Insurance Company: [employer] - Phone [phone] Fax [phone]  Pharmacy Filling the Rx: [id number] - [place], [place] - [address] AT [facility] [address]  Filling Pharmacy Phone: [phone]  Filling Pharmacy Fax:    Start Date: 5/8/2025

## 2025-05-12 NOTE — TELEPHONE ENCOUNTER
PRIOR AUTHORIZATION DENIED    Medication: tirzepatide-Weight Management (ZEPBOUND) 5 MG/0.5ML prefilled pen-PA DENIED     Denial Date: 5/9/2025    Denial Rational:             Appeal Information: